# Patient Record
Sex: FEMALE | Race: WHITE | NOT HISPANIC OR LATINO | URBAN - METROPOLITAN AREA
[De-identification: names, ages, dates, MRNs, and addresses within clinical notes are randomized per-mention and may not be internally consistent; named-entity substitution may affect disease eponyms.]

---

## 2017-01-18 ENCOUNTER — OUTPATIENT (OUTPATIENT)
Dept: OUTPATIENT SERVICES | Facility: HOSPITAL | Age: 52
LOS: 1 days | End: 2017-01-18
Payer: COMMERCIAL

## 2017-01-18 VITALS
HEIGHT: 61 IN | TEMPERATURE: 98 F | HEART RATE: 77 BPM | DIASTOLIC BLOOD PRESSURE: 80 MMHG | RESPIRATION RATE: 16 BRPM | SYSTOLIC BLOOD PRESSURE: 126 MMHG | WEIGHT: 126.99 LBS

## 2017-01-18 DIAGNOSIS — Z90.721 ACQUIRED ABSENCE OF OVARIES, UNILATERAL: Chronic | ICD-10-CM

## 2017-01-18 DIAGNOSIS — Z98.89 OTHER SPECIFIED POSTPROCEDURAL STATES: Chronic | ICD-10-CM

## 2017-01-18 DIAGNOSIS — K56.5 INTESTINAL ADHESIONS [BANDS] WITH OBSTRUCTION (POSTINFECTION): ICD-10-CM

## 2017-01-18 DIAGNOSIS — C49.9 MALIGNANT NEOPLASM OF CONNECTIVE AND SOFT TISSUE, UNSPECIFIED: Chronic | ICD-10-CM

## 2017-01-18 LAB
ALBUMIN SERPL ELPH-MCNC: 4.2 G/DL — SIGNIFICANT CHANGE UP (ref 3.3–5)
ALP SERPL-CCNC: 67 U/L — SIGNIFICANT CHANGE UP (ref 40–120)
ALT FLD-CCNC: 7 U/L — SIGNIFICANT CHANGE UP (ref 4–33)
AST SERPL-CCNC: 15 U/L — SIGNIFICANT CHANGE UP (ref 4–32)
BASOPHILS # BLD AUTO: 0.02 K/UL — SIGNIFICANT CHANGE UP (ref 0–0.2)
BASOPHILS NFR BLD AUTO: 0.3 % — SIGNIFICANT CHANGE UP (ref 0–2)
BILIRUB DIRECT SERPL-MCNC: 0.1 MG/DL — SIGNIFICANT CHANGE UP (ref 0.1–0.2)
BILIRUB SERPL-MCNC: 0.2 MG/DL — SIGNIFICANT CHANGE UP (ref 0.2–1.2)
BLD GP AB SCN SERPL QL: NEGATIVE — SIGNIFICANT CHANGE UP
BUN SERPL-MCNC: 14 MG/DL — SIGNIFICANT CHANGE UP (ref 7–23)
CALCIUM SERPL-MCNC: 9.2 MG/DL — SIGNIFICANT CHANGE UP (ref 8.4–10.5)
CHLORIDE SERPL-SCNC: 101 MMOL/L — SIGNIFICANT CHANGE UP (ref 98–107)
CO2 SERPL-SCNC: 29 MMOL/L — SIGNIFICANT CHANGE UP (ref 22–31)
CREAT SERPL-MCNC: 0.64 MG/DL — SIGNIFICANT CHANGE UP (ref 0.5–1.3)
EOSINOPHIL # BLD AUTO: 0.09 K/UL — SIGNIFICANT CHANGE UP (ref 0–0.5)
EOSINOPHIL NFR BLD AUTO: 1.5 % — SIGNIFICANT CHANGE UP (ref 0–6)
GLUCOSE SERPL-MCNC: 94 MG/DL — SIGNIFICANT CHANGE UP (ref 70–99)
HCT VFR BLD CALC: 39 % — SIGNIFICANT CHANGE UP (ref 34.5–45)
HCT VFR BLD CALC: 39 % — SIGNIFICANT CHANGE UP (ref 34.5–45)
HGB BLD-MCNC: 12.6 G/DL — SIGNIFICANT CHANGE UP (ref 11.5–15.5)
HGB BLD-MCNC: 12.6 G/DL — SIGNIFICANT CHANGE UP (ref 11.5–15.5)
IMM GRANULOCYTES NFR BLD AUTO: 0.3 % — SIGNIFICANT CHANGE UP (ref 0–1.5)
LYMPHOCYTES # BLD AUTO: 1.32 K/UL — SIGNIFICANT CHANGE UP (ref 1–3.3)
LYMPHOCYTES # BLD AUTO: 21.4 % — SIGNIFICANT CHANGE UP (ref 13–44)
MCHC RBC-ENTMCNC: 28.4 PG — SIGNIFICANT CHANGE UP (ref 27–34)
MCHC RBC-ENTMCNC: 28.4 PG — SIGNIFICANT CHANGE UP (ref 27–34)
MCHC RBC-ENTMCNC: 32.3 % — SIGNIFICANT CHANGE UP (ref 32–36)
MCHC RBC-ENTMCNC: 32.3 % — SIGNIFICANT CHANGE UP (ref 32–36)
MCV RBC AUTO: 87.8 FL — SIGNIFICANT CHANGE UP (ref 80–100)
MCV RBC AUTO: 87.8 FL — SIGNIFICANT CHANGE UP (ref 80–100)
MONOCYTES # BLD AUTO: 0.29 K/UL — SIGNIFICANT CHANGE UP (ref 0–0.9)
MONOCYTES NFR BLD AUTO: 4.7 % — SIGNIFICANT CHANGE UP (ref 2–14)
NEUTROPHILS # BLD AUTO: 4.42 K/UL — SIGNIFICANT CHANGE UP (ref 1.8–7.4)
NEUTROPHILS NFR BLD AUTO: 71.8 % — SIGNIFICANT CHANGE UP (ref 43–77)
PLATELET # BLD AUTO: 265 K/UL — SIGNIFICANT CHANGE UP (ref 150–400)
PLATELET # BLD AUTO: 265 K/UL — SIGNIFICANT CHANGE UP (ref 150–400)
PMV BLD: 11 FL — SIGNIFICANT CHANGE UP (ref 7–13)
PMV BLD: 11 FL — SIGNIFICANT CHANGE UP (ref 7–13)
POTASSIUM SERPL-MCNC: 4 MMOL/L — SIGNIFICANT CHANGE UP (ref 3.5–5.3)
POTASSIUM SERPL-SCNC: 4 MMOL/L — SIGNIFICANT CHANGE UP (ref 3.5–5.3)
PROT SERPL-MCNC: 6.8 G/DL — SIGNIFICANT CHANGE UP (ref 6–8.3)
RBC # BLD: 4.44 M/UL — SIGNIFICANT CHANGE UP (ref 3.8–5.2)
RBC # BLD: 4.44 M/UL — SIGNIFICANT CHANGE UP (ref 3.8–5.2)
RBC # FLD: 13.6 % — SIGNIFICANT CHANGE UP (ref 10.3–14.5)
RBC # FLD: 13.6 % — SIGNIFICANT CHANGE UP (ref 10.3–14.5)
RH IG SCN BLD-IMP: POSITIVE — SIGNIFICANT CHANGE UP
SODIUM SERPL-SCNC: 144 MMOL/L — SIGNIFICANT CHANGE UP (ref 135–145)
WBC # BLD: 6.16 K/UL — SIGNIFICANT CHANGE UP (ref 3.8–10.5)
WBC # BLD: 6.16 K/UL — SIGNIFICANT CHANGE UP (ref 3.8–10.5)
WBC # FLD AUTO: 6.16 K/UL — SIGNIFICANT CHANGE UP (ref 3.8–10.5)
WBC # FLD AUTO: 6.16 K/UL — SIGNIFICANT CHANGE UP (ref 3.8–10.5)

## 2017-01-18 PROCEDURE — 93010 ELECTROCARDIOGRAM REPORT: CPT

## 2017-01-18 RX ORDER — SODIUM CHLORIDE 9 MG/ML
1000 INJECTION, SOLUTION INTRAVENOUS
Qty: 0 | Refills: 0 | Status: DISCONTINUED | OUTPATIENT
Start: 2017-02-13 | End: 2017-02-14

## 2017-01-18 NOTE — H&P PST ADULT - NSANTHOSAYNRD_GEN_A_CORE
No. FELICIA screening performed.  STOP BANG Legend: 0-2 = LOW Risk; 3-4 = INTERMEDIATE Risk; 5-8 = HIGH Risk

## 2017-01-18 NOTE — H&P PST ADULT - FAMILY HISTORY
<<-----Click on this checkbox to enter Family History Family history of brain cancer     Sibling  Still living? Yes, Estimated age: Age Unknown  Family history of thyroid cancer, Age at diagnosis: Age Unknown Sibling  Still living? No  Family history of brain cancer, Age at diagnosis: Age Unknown  Family history of thyroid cancer, Age at diagnosis: Age Unknown

## 2017-01-18 NOTE — H&P PST ADULT - PMH
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

## 2017-01-18 NOTE — H&P PST ADULT - ATTENDING COMMENTS
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 Plan for laparoscopic adhesiolysis, possible open.  R/B/A explained in office and again in SDA.

## 2017-01-18 NOTE — H&P PST ADULT - ASSESSMENT
52 y/o female with hx of multiple abdominal surgeries including appendectomy 1972, myomectomy 1995, cystectomy 2002, oophorectomy 2004.  Pt reports upper abdominal pain with N/V May 2016. Dx with small bowel obstruction, scar tissue from previous surgeries, s/p [facility] admission.  Pt reports she has had frequent episodes since that time which are becoming more frequent, most recently 1/15/17.   Now scheduled for Diagnostic Laparoscopy for small bowel obstruction recurrent, 1/27/17.

## 2017-01-23 ENCOUNTER — FORM ENCOUNTER (OUTPATIENT)
Age: 52
End: 2017-01-23

## 2017-01-24 ENCOUNTER — OUTPATIENT (OUTPATIENT)
Dept: OUTPATIENT SERVICES | Facility: HOSPITAL | Age: 52
LOS: 1 days | End: 2017-01-24
Payer: COMMERCIAL

## 2017-01-24 ENCOUNTER — APPOINTMENT (OUTPATIENT)
Dept: CT IMAGING | Facility: CLINIC | Age: 52
End: 2017-01-24

## 2017-01-24 ENCOUNTER — APPOINTMENT (OUTPATIENT)
Dept: MRI IMAGING | Facility: CLINIC | Age: 52
End: 2017-01-24

## 2017-01-24 DIAGNOSIS — K56.5 INTESTINAL ADHESIONS [BANDS] WITH OBSTRUCTION (POSTINFECTION): ICD-10-CM

## 2017-01-24 DIAGNOSIS — C49.9 MALIGNANT NEOPLASM OF CONNECTIVE AND SOFT TISSUE, UNSPECIFIED: Chronic | ICD-10-CM

## 2017-01-24 DIAGNOSIS — Z98.89 OTHER SPECIFIED POSTPROCEDURAL STATES: Chronic | ICD-10-CM

## 2017-01-24 DIAGNOSIS — C49.9 MALIGNANT NEOPLASM OF CONNECTIVE AND SOFT TISSUE, UNSPECIFIED: ICD-10-CM

## 2017-01-24 DIAGNOSIS — Z90.721 ACQUIRED ABSENCE OF OVARIES, UNILATERAL: Chronic | ICD-10-CM

## 2017-01-24 PROCEDURE — A9585: CPT

## 2017-01-24 PROCEDURE — 71260 CT THORAX DX C+: CPT

## 2017-01-24 PROCEDURE — 74177 CT ABD & PELVIS W/CONTRAST: CPT

## 2017-01-24 PROCEDURE — 73723 MRI JOINT LWR EXTR W/O&W/DYE: CPT

## 2017-01-26 ENCOUNTER — APPOINTMENT (OUTPATIENT)
Dept: SURGICAL ONCOLOGY | Facility: CLINIC | Age: 52
End: 2017-01-26

## 2017-01-27 ENCOUNTER — APPOINTMENT (OUTPATIENT)
Dept: SURGICAL ONCOLOGY | Facility: HOSPITAL | Age: 52
End: 2017-01-27

## 2017-01-31 ENCOUNTER — RX RENEWAL (OUTPATIENT)
Age: 52
End: 2017-01-31

## 2017-02-06 ENCOUNTER — OUTPATIENT (OUTPATIENT)
Dept: OUTPATIENT SERVICES | Facility: HOSPITAL | Age: 52
LOS: 1 days | End: 2017-02-06

## 2017-02-06 DIAGNOSIS — Z98.89 OTHER SPECIFIED POSTPROCEDURAL STATES: Chronic | ICD-10-CM

## 2017-02-06 DIAGNOSIS — C49.9 MALIGNANT NEOPLASM OF CONNECTIVE AND SOFT TISSUE, UNSPECIFIED: Chronic | ICD-10-CM

## 2017-02-06 DIAGNOSIS — K56.5 INTESTINAL ADHESIONS [BANDS] WITH OBSTRUCTION (POSTINFECTION): ICD-10-CM

## 2017-02-06 DIAGNOSIS — Z90.721 ACQUIRED ABSENCE OF OVARIES, UNILATERAL: Chronic | ICD-10-CM

## 2017-02-06 LAB
BLD GP AB SCN SERPL QL: NEGATIVE — SIGNIFICANT CHANGE UP
RH IG SCN BLD-IMP: POSITIVE — SIGNIFICANT CHANGE UP

## 2017-02-12 ENCOUNTER — RESULT REVIEW (OUTPATIENT)
Age: 52
End: 2017-02-12

## 2017-02-13 ENCOUNTER — INPATIENT (INPATIENT)
Facility: HOSPITAL | Age: 52
LOS: 2 days | Discharge: HOME | End: 2017-02-16
Attending: SURGERY | Admitting: SURGERY
Payer: COMMERCIAL

## 2017-02-13 ENCOUNTER — APPOINTMENT (OUTPATIENT)
Dept: SURGICAL ONCOLOGY | Facility: HOSPITAL | Age: 52
End: 2017-02-13

## 2017-02-13 VITALS
HEIGHT: 61 IN | TEMPERATURE: 98 F | RESPIRATION RATE: 16 BRPM | SYSTOLIC BLOOD PRESSURE: 111 MMHG | DIASTOLIC BLOOD PRESSURE: 67 MMHG | OXYGEN SATURATION: 100 % | HEART RATE: 89 BPM | WEIGHT: 126.99 LBS

## 2017-02-13 DIAGNOSIS — Z90.721 ACQUIRED ABSENCE OF OVARIES, UNILATERAL: Chronic | ICD-10-CM

## 2017-02-13 DIAGNOSIS — Z98.89 OTHER SPECIFIED POSTPROCEDURAL STATES: Chronic | ICD-10-CM

## 2017-02-13 DIAGNOSIS — K56.5 INTESTINAL ADHESIONS [BANDS] WITH OBSTRUCTION (POSTINFECTION): ICD-10-CM

## 2017-02-13 DIAGNOSIS — C49.9 MALIGNANT NEOPLASM OF CONNECTIVE AND SOFT TISSUE, UNSPECIFIED: Chronic | ICD-10-CM

## 2017-02-13 LAB — HCG UR QL: NEGATIVE — SIGNIFICANT CHANGE UP

## 2017-02-13 PROCEDURE — 88307 TISSUE EXAM BY PATHOLOGIST: CPT | Mod: 26

## 2017-02-13 RX ORDER — HYDROMORPHONE HYDROCHLORIDE 2 MG/ML
30 INJECTION INTRAMUSCULAR; INTRAVENOUS; SUBCUTANEOUS
Qty: 0 | Refills: 0 | Status: DISCONTINUED | OUTPATIENT
Start: 2017-02-13 | End: 2017-02-15

## 2017-02-13 RX ORDER — HYDROMORPHONE HYDROCHLORIDE 2 MG/ML
0.5 INJECTION INTRAMUSCULAR; INTRAVENOUS; SUBCUTANEOUS
Qty: 0 | Refills: 0 | Status: DISCONTINUED | OUTPATIENT
Start: 2017-02-13 | End: 2017-02-13

## 2017-02-13 RX ORDER — QUETIAPINE FUMARATE 200 MG/1
25 TABLET, FILM COATED ORAL AT BEDTIME
Qty: 0 | Refills: 0 | Status: DISCONTINUED | OUTPATIENT
Start: 2017-02-13 | End: 2017-02-16

## 2017-02-13 RX ORDER — KETOROLAC TROMETHAMINE 30 MG/ML
15 SYRINGE (ML) INJECTION EVERY 6 HOURS
Qty: 0 | Refills: 0 | Status: DISCONTINUED | OUTPATIENT
Start: 2017-02-13 | End: 2017-02-13

## 2017-02-13 RX ORDER — HYDROMORPHONE HYDROCHLORIDE 2 MG/ML
1 INJECTION INTRAMUSCULAR; INTRAVENOUS; SUBCUTANEOUS
Qty: 0 | Refills: 0 | Status: DISCONTINUED | OUTPATIENT
Start: 2017-02-13 | End: 2017-02-13

## 2017-02-13 RX ORDER — ONDANSETRON 8 MG/1
4 TABLET, FILM COATED ORAL EVERY 6 HOURS
Qty: 0 | Refills: 0 | Status: DISCONTINUED | OUTPATIENT
Start: 2017-02-13 | End: 2017-02-16

## 2017-02-13 RX ORDER — HEPARIN SODIUM 5000 [USP'U]/ML
5000 INJECTION INTRAVENOUS; SUBCUTANEOUS EVERY 8 HOURS
Qty: 0 | Refills: 0 | Status: DISCONTINUED | OUTPATIENT
Start: 2017-02-13 | End: 2017-02-16

## 2017-02-13 RX ORDER — ONDANSETRON 8 MG/1
4 TABLET, FILM COATED ORAL EVERY 6 HOURS
Qty: 0 | Refills: 0 | Status: DISCONTINUED | OUTPATIENT
Start: 2017-02-13 | End: 2017-02-15

## 2017-02-13 RX ORDER — HYDROMORPHONE HYDROCHLORIDE 2 MG/ML
0.5 INJECTION INTRAMUSCULAR; INTRAVENOUS; SUBCUTANEOUS
Qty: 0 | Refills: 0 | Status: DISCONTINUED | OUTPATIENT
Start: 2017-02-13 | End: 2017-02-15

## 2017-02-13 RX ORDER — ATORVASTATIN CALCIUM 80 MG/1
10 TABLET, FILM COATED ORAL AT BEDTIME
Qty: 0 | Refills: 0 | Status: DISCONTINUED | OUTPATIENT
Start: 2017-02-13 | End: 2017-02-16

## 2017-02-13 RX ORDER — ONDANSETRON 8 MG/1
4 TABLET, FILM COATED ORAL ONCE
Qty: 0 | Refills: 0 | Status: DISCONTINUED | OUTPATIENT
Start: 2017-02-13 | End: 2017-02-16

## 2017-02-13 RX ORDER — PANTOPRAZOLE SODIUM 20 MG/1
40 TABLET, DELAYED RELEASE ORAL DAILY
Qty: 0 | Refills: 0 | Status: DISCONTINUED | OUTPATIENT
Start: 2017-02-13 | End: 2017-02-16

## 2017-02-13 RX ORDER — ACETAMINOPHEN 500 MG
1000 TABLET ORAL ONCE
Qty: 0 | Refills: 0 | Status: COMPLETED | OUTPATIENT
Start: 2017-02-14 | End: 2017-02-14

## 2017-02-13 RX ORDER — SODIUM CHLORIDE 9 MG/ML
1000 INJECTION, SOLUTION INTRAVENOUS ONCE
Qty: 0 | Refills: 0 | Status: COMPLETED | OUTPATIENT
Start: 2017-02-13 | End: 2017-02-13

## 2017-02-13 RX ORDER — NALOXONE HYDROCHLORIDE 4 MG/.1ML
0.1 SPRAY NASAL
Qty: 0 | Refills: 0 | Status: DISCONTINUED | OUTPATIENT
Start: 2017-02-13 | End: 2017-02-15

## 2017-02-13 RX ORDER — KETOROLAC TROMETHAMINE 30 MG/ML
15 SYRINGE (ML) INJECTION EVERY 6 HOURS
Qty: 0 | Refills: 0 | Status: DISCONTINUED | OUTPATIENT
Start: 2017-02-13 | End: 2017-02-15

## 2017-02-13 RX ORDER — VENLAFAXINE HCL 75 MG
75 CAPSULE, EXT RELEASE 24 HR ORAL
Qty: 0 | Refills: 0 | Status: DISCONTINUED | OUTPATIENT
Start: 2017-02-13 | End: 2017-02-16

## 2017-02-13 RX ORDER — HYDROMORPHONE HYDROCHLORIDE 2 MG/ML
1 INJECTION INTRAMUSCULAR; INTRAVENOUS; SUBCUTANEOUS
Qty: 0 | Refills: 0 | Status: DISCONTINUED | OUTPATIENT
Start: 2017-02-13 | End: 2017-02-15

## 2017-02-13 RX ORDER — ACETAMINOPHEN 500 MG
1000 TABLET ORAL ONCE
Qty: 0 | Refills: 0 | Status: COMPLETED | OUTPATIENT
Start: 2017-02-13 | End: 2017-02-13

## 2017-02-13 RX ADMIN — HYDROMORPHONE HYDROCHLORIDE 0.5 MILLIGRAM(S): 2 INJECTION INTRAMUSCULAR; INTRAVENOUS; SUBCUTANEOUS at 18:33

## 2017-02-13 RX ADMIN — HYDROMORPHONE HYDROCHLORIDE 30 MILLILITER(S): 2 INJECTION INTRAMUSCULAR; INTRAVENOUS; SUBCUTANEOUS at 22:58

## 2017-02-13 RX ADMIN — SODIUM CHLORIDE 2000 MILLILITER(S): 9 INJECTION, SOLUTION INTRAVENOUS at 20:00

## 2017-02-13 RX ADMIN — HYDROMORPHONE HYDROCHLORIDE 0.5 MILLIGRAM(S): 2 INJECTION INTRAMUSCULAR; INTRAVENOUS; SUBCUTANEOUS at 17:58

## 2017-02-13 RX ADMIN — QUETIAPINE FUMARATE 25 MILLIGRAM(S): 200 TABLET, FILM COATED ORAL at 23:33

## 2017-02-13 RX ADMIN — Medication 15 MILLIGRAM(S): at 21:54

## 2017-02-13 RX ADMIN — ATORVASTATIN CALCIUM 10 MILLIGRAM(S): 80 TABLET, FILM COATED ORAL at 23:33

## 2017-02-13 RX ADMIN — Medication 400 MILLIGRAM(S): at 21:55

## 2017-02-13 RX ADMIN — HEPARIN SODIUM 5000 UNIT(S): 5000 INJECTION INTRAVENOUS; SUBCUTANEOUS at 21:55

## 2017-02-13 RX ADMIN — SODIUM CHLORIDE 75 MILLILITER(S): 9 INJECTION, SOLUTION INTRAVENOUS at 22:57

## 2017-02-13 RX ADMIN — HYDROMORPHONE HYDROCHLORIDE 30 MILLILITER(S): 2 INJECTION INTRAMUSCULAR; INTRAVENOUS; SUBCUTANEOUS at 18:11

## 2017-02-13 NOTE — ASU PATIENT PROFILE, ADULT - PSH
S/P appendectomy  1972  S/P  section    S/P lumpectomy, right breast  benign   S/P myomectomy    S/P oophorectomy  2005  S/P ovarian cystectomy    S/P tonsillectomy  1992  Sarcoma  s/p removal 2011  surgery 10/17/2014  Radiation afterwards

## 2017-02-13 NOTE — BRIEF OPERATIVE NOTE - PROCEDURE
Omental flap intra-abdominal  02/13/2017  overlying anastamosis  Active  [id number]  Small bowel resection, exteriorized segment  02/13/2017    Active  [id number]  Enterolysis, laparoscopic  02/13/2017    Active  [id number]  Diagnostic laparoscopy  02/13/2017    Active  [id number]

## 2017-02-13 NOTE — BRIEF OPERATIVE NOTE - OPERATION/FINDINGS
Extensive adhesions, fibrotic changes in RLQ secondary to radiation. Small bowel run to reveal section causing chronic obstructions, 2 loops of small bowel just proximal to TI. Adhesions lysed and bowel exteriorized, this section resected and a side to side anastomosis performed.

## 2017-02-13 NOTE — ASU PATIENT PROFILE, ADULT - PMH
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

## 2017-02-14 LAB
BUN SERPL-MCNC: 13 MG/DL — SIGNIFICANT CHANGE UP (ref 7–23)
CALCIUM SERPL-MCNC: 8.4 MG/DL — SIGNIFICANT CHANGE UP (ref 8.4–10.5)
CHLORIDE SERPL-SCNC: 102 MMOL/L — SIGNIFICANT CHANGE UP (ref 98–107)
CO2 SERPL-SCNC: 23 MMOL/L — SIGNIFICANT CHANGE UP (ref 22–31)
CREAT SERPL-MCNC: 0.51 MG/DL — SIGNIFICANT CHANGE UP (ref 0.5–1.3)
GLUCOSE SERPL-MCNC: 73 MG/DL — SIGNIFICANT CHANGE UP (ref 70–99)
HCT VFR BLD CALC: 31.1 % — LOW (ref 34.5–45)
HGB BLD-MCNC: 10.2 G/DL — LOW (ref 11.5–15.5)
MAGNESIUM SERPL-MCNC: 1.7 MG/DL — SIGNIFICANT CHANGE UP (ref 1.6–2.6)
MCHC RBC-ENTMCNC: 28.2 PG — SIGNIFICANT CHANGE UP (ref 27–34)
MCHC RBC-ENTMCNC: 32.8 % — SIGNIFICANT CHANGE UP (ref 32–36)
MCV RBC AUTO: 85.9 FL — SIGNIFICANT CHANGE UP (ref 80–100)
PHOSPHATE SERPL-MCNC: 4.5 MG/DL — SIGNIFICANT CHANGE UP (ref 2.5–4.5)
PLATELET # BLD AUTO: 193 K/UL — SIGNIFICANT CHANGE UP (ref 150–400)
PMV BLD: 10.5 FL — SIGNIFICANT CHANGE UP (ref 7–13)
POTASSIUM SERPL-MCNC: 3.9 MMOL/L — SIGNIFICANT CHANGE UP (ref 3.5–5.3)
POTASSIUM SERPL-SCNC: 3.9 MMOL/L — SIGNIFICANT CHANGE UP (ref 3.5–5.3)
RBC # BLD: 3.62 M/UL — LOW (ref 3.8–5.2)
RBC # FLD: 13.3 % — SIGNIFICANT CHANGE UP (ref 10.3–14.5)
SODIUM SERPL-SCNC: 140 MMOL/L — SIGNIFICANT CHANGE UP (ref 135–145)
WBC # BLD: 9.46 K/UL — SIGNIFICANT CHANGE UP (ref 3.8–10.5)
WBC # FLD AUTO: 9.46 K/UL — SIGNIFICANT CHANGE UP (ref 3.8–10.5)

## 2017-02-14 RX ORDER — SODIUM CHLORIDE 9 MG/ML
1000 INJECTION, SOLUTION INTRAVENOUS
Qty: 0 | Refills: 0 | Status: DISCONTINUED | OUTPATIENT
Start: 2017-02-14 | End: 2017-02-15

## 2017-02-14 RX ORDER — SODIUM CHLORIDE 9 MG/ML
1000 INJECTION, SOLUTION INTRAVENOUS
Qty: 0 | Refills: 0 | Status: DISCONTINUED | OUTPATIENT
Start: 2017-02-14 | End: 2017-02-14

## 2017-02-14 RX ORDER — MAGNESIUM SULFATE 500 MG/ML
2 VIAL (ML) INJECTION ONCE
Qty: 0 | Refills: 0 | Status: COMPLETED | OUTPATIENT
Start: 2017-02-14 | End: 2017-02-14

## 2017-02-14 RX ADMIN — Medication 75 MILLIGRAM(S): at 07:32

## 2017-02-14 RX ADMIN — Medication 15 MILLIGRAM(S): at 16:29

## 2017-02-14 RX ADMIN — SODIUM CHLORIDE 125 MILLILITER(S): 9 INJECTION, SOLUTION INTRAVENOUS at 15:37

## 2017-02-14 RX ADMIN — Medication 1000 MILLIGRAM(S): at 05:40

## 2017-02-14 RX ADMIN — Medication 15 MILLIGRAM(S): at 05:40

## 2017-02-14 RX ADMIN — Medication 1000 MILLIGRAM(S): at 10:13

## 2017-02-14 RX ADMIN — Medication 50 GRAM(S): at 09:30

## 2017-02-14 RX ADMIN — HYDROMORPHONE HYDROCHLORIDE 30 MILLILITER(S): 2 INJECTION INTRAMUSCULAR; INTRAVENOUS; SUBCUTANEOUS at 20:03

## 2017-02-14 RX ADMIN — Medication 15 MILLIGRAM(S): at 16:16

## 2017-02-14 RX ADMIN — Medication 15 MILLIGRAM(S): at 10:04

## 2017-02-14 RX ADMIN — Medication 1000 MILLIGRAM(S): at 16:29

## 2017-02-14 RX ADMIN — QUETIAPINE FUMARATE 25 MILLIGRAM(S): 200 TABLET, FILM COATED ORAL at 21:37

## 2017-02-14 RX ADMIN — ATORVASTATIN CALCIUM 10 MILLIGRAM(S): 80 TABLET, FILM COATED ORAL at 21:37

## 2017-02-14 RX ADMIN — SODIUM CHLORIDE 93 MILLILITER(S): 9 INJECTION, SOLUTION INTRAVENOUS at 07:32

## 2017-02-14 RX ADMIN — Medication 15 MILLIGRAM(S): at 21:37

## 2017-02-14 RX ADMIN — PANTOPRAZOLE SODIUM 40 MILLIGRAM(S): 20 TABLET, DELAYED RELEASE ORAL at 05:09

## 2017-02-14 RX ADMIN — Medication 15 MILLIGRAM(S): at 05:10

## 2017-02-14 RX ADMIN — Medication 15 MILLIGRAM(S): at 22:07

## 2017-02-14 RX ADMIN — HEPARIN SODIUM 5000 UNIT(S): 5000 INJECTION INTRAVENOUS; SUBCUTANEOUS at 21:37

## 2017-02-14 RX ADMIN — HEPARIN SODIUM 5000 UNIT(S): 5000 INJECTION INTRAVENOUS; SUBCUTANEOUS at 13:35

## 2017-02-14 RX ADMIN — HEPARIN SODIUM 5000 UNIT(S): 5000 INJECTION INTRAVENOUS; SUBCUTANEOUS at 05:10

## 2017-02-14 RX ADMIN — Medication 15 MILLIGRAM(S): at 10:13

## 2017-02-14 RX ADMIN — ONDANSETRON 4 MILLIGRAM(S): 8 TABLET, FILM COATED ORAL at 15:47

## 2017-02-14 RX ADMIN — Medication 400 MILLIGRAM(S): at 10:04

## 2017-02-14 RX ADMIN — Medication 400 MILLIGRAM(S): at 05:10

## 2017-02-14 RX ADMIN — HYDROMORPHONE HYDROCHLORIDE 30 MILLILITER(S): 2 INJECTION INTRAMUSCULAR; INTRAVENOUS; SUBCUTANEOUS at 07:56

## 2017-02-14 RX ADMIN — Medication 400 MILLIGRAM(S): at 16:15

## 2017-02-14 NOTE — PROVIDER CONTACT NOTE (OTHER) - ACTION/TREATMENT ORDERED:
LR bolus as ordered
Pa aware. continue to monitor.
Md aware. Pain services consulted, no need for pca pump adjustment. since uring output is adequate no need for bolus. MD increased fluids from 75-93

## 2017-02-15 LAB
BASOPHILS # BLD AUTO: 0.01 K/UL — SIGNIFICANT CHANGE UP (ref 0–0.2)
BASOPHILS NFR BLD AUTO: 0.2 % — SIGNIFICANT CHANGE UP (ref 0–2)
BUN SERPL-MCNC: 7 MG/DL — SIGNIFICANT CHANGE UP (ref 7–23)
CALCIUM SERPL-MCNC: 8.5 MG/DL — SIGNIFICANT CHANGE UP (ref 8.4–10.5)
CHLORIDE SERPL-SCNC: 102 MMOL/L — SIGNIFICANT CHANGE UP (ref 98–107)
CO2 SERPL-SCNC: 24 MMOL/L — SIGNIFICANT CHANGE UP (ref 22–31)
CREAT SERPL-MCNC: 0.52 MG/DL — SIGNIFICANT CHANGE UP (ref 0.5–1.3)
EOSINOPHIL # BLD AUTO: 0.15 K/UL — SIGNIFICANT CHANGE UP (ref 0–0.5)
EOSINOPHIL NFR BLD AUTO: 2.5 % — SIGNIFICANT CHANGE UP (ref 0–6)
GLUCOSE SERPL-MCNC: 68 MG/DL — LOW (ref 70–99)
HCT VFR BLD CALC: 32.8 % — LOW (ref 34.5–45)
HGB BLD-MCNC: 10.9 G/DL — LOW (ref 11.5–15.5)
IMM GRANULOCYTES NFR BLD AUTO: 0.2 % — SIGNIFICANT CHANGE UP (ref 0–1.5)
LYMPHOCYTES # BLD AUTO: 0.56 K/UL — LOW (ref 1–3.3)
LYMPHOCYTES # BLD AUTO: 9.4 % — LOW (ref 13–44)
MAGNESIUM SERPL-MCNC: 1.8 MG/DL — SIGNIFICANT CHANGE UP (ref 1.6–2.6)
MCHC RBC-ENTMCNC: 28.9 PG — SIGNIFICANT CHANGE UP (ref 27–34)
MCHC RBC-ENTMCNC: 33.2 % — SIGNIFICANT CHANGE UP (ref 32–36)
MCV RBC AUTO: 87 FL — SIGNIFICANT CHANGE UP (ref 80–100)
MONOCYTES # BLD AUTO: 0.24 K/UL — SIGNIFICANT CHANGE UP (ref 0–0.9)
MONOCYTES NFR BLD AUTO: 4 % — SIGNIFICANT CHANGE UP (ref 2–14)
NEUTROPHILS # BLD AUTO: 4.98 K/UL — SIGNIFICANT CHANGE UP (ref 1.8–7.4)
NEUTROPHILS NFR BLD AUTO: 83.7 % — HIGH (ref 43–77)
PHOSPHATE SERPL-MCNC: 2.3 MG/DL — LOW (ref 2.5–4.5)
PLATELET # BLD AUTO: 195 K/UL — SIGNIFICANT CHANGE UP (ref 150–400)
PMV BLD: 10.7 FL — SIGNIFICANT CHANGE UP (ref 7–13)
POTASSIUM SERPL-MCNC: 3.8 MMOL/L — SIGNIFICANT CHANGE UP (ref 3.5–5.3)
POTASSIUM SERPL-SCNC: 3.8 MMOL/L — SIGNIFICANT CHANGE UP (ref 3.5–5.3)
RBC # BLD: 3.77 M/UL — LOW (ref 3.8–5.2)
RBC # FLD: 13.3 % — SIGNIFICANT CHANGE UP (ref 10.3–14.5)
SODIUM SERPL-SCNC: 141 MMOL/L — SIGNIFICANT CHANGE UP (ref 135–145)
WBC # BLD: 5.95 K/UL — SIGNIFICANT CHANGE UP (ref 3.8–10.5)
WBC # FLD AUTO: 5.95 K/UL — SIGNIFICANT CHANGE UP (ref 3.8–10.5)

## 2017-02-15 RX ORDER — ACETAMINOPHEN 500 MG
650 TABLET ORAL EVERY 6 HOURS
Qty: 0 | Refills: 0 | Status: DISCONTINUED | OUTPATIENT
Start: 2017-02-15 | End: 2017-02-16

## 2017-02-15 RX ORDER — OXYCODONE HYDROCHLORIDE 5 MG/1
5 TABLET ORAL
Qty: 0 | Refills: 0 | Status: DISCONTINUED | OUTPATIENT
Start: 2017-02-15 | End: 2017-02-16

## 2017-02-15 RX ORDER — OXYCODONE HYDROCHLORIDE 5 MG/1
10 TABLET ORAL
Qty: 0 | Refills: 0 | Status: DISCONTINUED | OUTPATIENT
Start: 2017-02-15 | End: 2017-02-16

## 2017-02-15 RX ORDER — POTASSIUM PHOSPHATE, MONOBASIC POTASSIUM PHOSPHATE, DIBASIC 236; 224 MG/ML; MG/ML
15 INJECTION, SOLUTION INTRAVENOUS ONCE
Qty: 0 | Refills: 0 | Status: COMPLETED | OUTPATIENT
Start: 2017-02-15 | End: 2017-02-15

## 2017-02-15 RX ORDER — MAGNESIUM SULFATE 500 MG/ML
2 VIAL (ML) INJECTION ONCE
Qty: 0 | Refills: 0 | Status: COMPLETED | OUTPATIENT
Start: 2017-02-15 | End: 2017-02-15

## 2017-02-15 RX ADMIN — ATORVASTATIN CALCIUM 10 MILLIGRAM(S): 80 TABLET, FILM COATED ORAL at 21:29

## 2017-02-15 RX ADMIN — Medication 650 MILLIGRAM(S): at 17:47

## 2017-02-15 RX ADMIN — Medication 15 MILLIGRAM(S): at 12:07

## 2017-02-15 RX ADMIN — Medication 650 MILLIGRAM(S): at 12:06

## 2017-02-15 RX ADMIN — OXYCODONE HYDROCHLORIDE 5 MILLIGRAM(S): 5 TABLET ORAL at 16:46

## 2017-02-15 RX ADMIN — HEPARIN SODIUM 5000 UNIT(S): 5000 INJECTION INTRAVENOUS; SUBCUTANEOUS at 14:42

## 2017-02-15 RX ADMIN — OXYCODONE HYDROCHLORIDE 5 MILLIGRAM(S): 5 TABLET ORAL at 12:29

## 2017-02-15 RX ADMIN — OXYCODONE HYDROCHLORIDE 5 MILLIGRAM(S): 5 TABLET ORAL at 22:04

## 2017-02-15 RX ADMIN — Medication 75 MILLIGRAM(S): at 07:45

## 2017-02-15 RX ADMIN — PANTOPRAZOLE SODIUM 40 MILLIGRAM(S): 20 TABLET, DELAYED RELEASE ORAL at 05:57

## 2017-02-15 RX ADMIN — Medication 15 MILLIGRAM(S): at 06:15

## 2017-02-15 RX ADMIN — Medication 15 MILLIGRAM(S): at 05:58

## 2017-02-15 RX ADMIN — HYDROMORPHONE HYDROCHLORIDE 30 MILLILITER(S): 2 INJECTION INTRAMUSCULAR; INTRAVENOUS; SUBCUTANEOUS at 08:21

## 2017-02-15 RX ADMIN — HEPARIN SODIUM 5000 UNIT(S): 5000 INJECTION INTRAVENOUS; SUBCUTANEOUS at 21:29

## 2017-02-15 RX ADMIN — OXYCODONE HYDROCHLORIDE 5 MILLIGRAM(S): 5 TABLET ORAL at 21:34

## 2017-02-15 RX ADMIN — Medication 50 GRAM(S): at 07:44

## 2017-02-15 RX ADMIN — POTASSIUM PHOSPHATE, MONOBASIC POTASSIUM PHOSPHATE, DIBASIC 62.5 MILLIMOLE(S): 236; 224 INJECTION, SOLUTION INTRAVENOUS at 12:06

## 2017-02-15 RX ADMIN — QUETIAPINE FUMARATE 25 MILLIGRAM(S): 200 TABLET, FILM COATED ORAL at 21:28

## 2017-02-15 RX ADMIN — Medication 15 MILLIGRAM(S): at 17:47

## 2017-02-15 RX ADMIN — Medication 650 MILLIGRAM(S): at 13:15

## 2017-02-15 RX ADMIN — HEPARIN SODIUM 5000 UNIT(S): 5000 INJECTION INTRAVENOUS; SUBCUTANEOUS at 05:57

## 2017-02-15 RX ADMIN — SODIUM CHLORIDE 125 MILLILITER(S): 9 INJECTION, SOLUTION INTRAVENOUS at 07:45

## 2017-02-16 ENCOUNTER — TRANSCRIPTION ENCOUNTER (OUTPATIENT)
Age: 52
End: 2017-02-16

## 2017-02-16 VITALS
RESPIRATION RATE: 17 BRPM | DIASTOLIC BLOOD PRESSURE: 76 MMHG | HEART RATE: 80 BPM | OXYGEN SATURATION: 99 % | SYSTOLIC BLOOD PRESSURE: 117 MMHG | TEMPERATURE: 98 F

## 2017-02-16 PROCEDURE — 99238 HOSP IP/OBS DSCHRG MGMT 30/<: CPT | Mod: 24

## 2017-02-16 RX ORDER — ACETAMINOPHEN 500 MG
2 TABLET ORAL
Qty: 0 | Refills: 0 | DISCHARGE
Start: 2017-02-16

## 2017-02-16 RX ORDER — OXYCODONE HYDROCHLORIDE 5 MG/1
1 TABLET ORAL
Qty: 20 | Refills: 0
Start: 2017-02-16

## 2017-02-16 RX ORDER — ACETAMINOPHEN 500 MG
2 TABLET ORAL
Qty: 0 | Refills: 0 | COMMUNITY
Start: 2017-02-16

## 2017-02-16 RX ORDER — OXYCODONE HYDROCHLORIDE 5 MG/1
1 TABLET ORAL
Qty: 0 | Refills: 0 | DISCHARGE
Start: 2017-02-16

## 2017-02-16 RX ADMIN — Medication 75 MILLIGRAM(S): at 08:23

## 2017-02-16 RX ADMIN — OXYCODONE HYDROCHLORIDE 5 MILLIGRAM(S): 5 TABLET ORAL at 06:51

## 2017-02-16 RX ADMIN — OXYCODONE HYDROCHLORIDE 5 MILLIGRAM(S): 5 TABLET ORAL at 06:21

## 2017-02-16 RX ADMIN — Medication 650 MILLIGRAM(S): at 00:19

## 2017-02-16 RX ADMIN — Medication 650 MILLIGRAM(S): at 00:49

## 2017-02-16 RX ADMIN — PANTOPRAZOLE SODIUM 40 MILLIGRAM(S): 20 TABLET, DELAYED RELEASE ORAL at 06:16

## 2017-02-16 RX ADMIN — OXYCODONE HYDROCHLORIDE 5 MILLIGRAM(S): 5 TABLET ORAL at 10:37

## 2017-02-16 RX ADMIN — Medication 650 MILLIGRAM(S): at 11:31

## 2017-02-16 RX ADMIN — OXYCODONE HYDROCHLORIDE 5 MILLIGRAM(S): 5 TABLET ORAL at 10:01

## 2017-02-16 RX ADMIN — OXYCODONE HYDROCHLORIDE 5 MILLIGRAM(S): 5 TABLET ORAL at 13:02

## 2017-02-16 NOTE — DISCHARGE NOTE ADULT - MEDICATION SUMMARY - MEDICATIONS TO TAKE
I will START or STAY ON the medications listed below when I get home from the hospital:    oxyCODONE 5 mg oral tablet  -- 1-2 tabs every 4 hours as needed for pain- up to 6 tablets /day MDD:6  -- Indication: For pain    acetaminophen 325 mg oral tablet  -- 2 tab(s) by mouth every 6 hours, As Needed  -- Indication: For pain    Effexor 75 mg oral capsule, extended release  -- 1 cap(s) by mouth once a day in morning  -- Indication: For home med    Lipitor 10 mg oral tablet  -- 1 tab(s) by mouth once a day (at bedtime)  -- Indication: For home med    SEROquel 25 mg oral tablet  -- 1 tab(s) by mouth once a day (at bedtime)  -- Indication: For home med    Senokot 8.6 mg oral tablet  -- 1 tab(s) by mouth once a day (at bedtime)  -- Indication: For home med    Centrum Antioxidant Multiple Vitamins and Minerals oral tablet  -- 1 tab(s) by mouth once a day in morning- last doswe 1/20/2017  -- Indication: For home med

## 2017-02-16 NOTE — DISCHARGE NOTE ADULT - MEDICATION SUMMARY - MEDICATIONS TO STOP TAKING
I will STOP taking the medications listed below when I get home from the hospital:    meloxicam 15 mg oral tablet  -- 1 tab(s) by mouth once a day in morning -lasat dose 1/20/2017

## 2017-02-16 NOTE — DISCHARGE NOTE ADULT - CARE PLAN
Principal Discharge DX:	Small bowel obstruction due to adhesions  Goal:	status diagnostic laparoscopy, lysis of adhesions, small bowel resection, omental flap over lying anastamosis  Instructions for follow-up, activity and diet:	Please call  to schedule a follow up appointment in 1-2 weeks with Dr Lynch  -Please allow steri-strips to fall off on their own.  Ok to shower and rinse wound with warm soapy water.  Do not scrub wound, pat dry. Please call the doctor immediately if you develop fever, chills, inability to tolerate liquid or food, diarrhea, nausea, vomiting or increased abdominal pain. Follow a regular diet. Do not drive or operate machinery while taking narcotic pain medication.  Please call to make an appointment to follow up with your primary care physician regarding your recent hospitalization.  Secondary Diagnosis:	Hyperlipidemia  Goal:	Continue current medication Principal Discharge DX:	Small bowel obstruction due to adhesions  Goal:	Status post diagnostic laparoscopy, lysis of adhesions, small bowel resection, omental flap over lying anastamosis  Instructions for follow-up, activity and diet:	Please call  to schedule a follow up appointment in 1-2 weeks with Dr Lynch  Please allow steri-strips to fall off on their own.  Ok to shower and rinse wound with warm soapy water.  Do not scrub wound, pat dry. Please call the doctor immediately if you develop fever, chills, inability to tolerate liquid or food, diarrhea, nausea, vomiting or increased abdominal pain. Follow a regular diet. Do not drive or operate machinery while taking narcotic pain medication.  Please call to make an appointment to follow up with your primary care physician regarding your recent hospitalization.  Secondary Diagnosis:	Hyperlipidemia  Goal:	Continue current medication

## 2017-02-16 NOTE — DISCHARGE NOTE ADULT - HOSPITAL COURSE
52 y/o female with hx of multiple abdominal surgeries including appendectomy 1972, myomectomy 1995, cystectomy 2002, oophorectomy 2004.  Pt reports upper abdominal pain with N/V May 2016. Dx with small bowel obstruction, scar tissue from previous surgeries, s/p [facility] admission.  Pt reports she has had frequent episodes since that time which are becoming more frequent, most recently 1/15/17.   Now scheduled for Diagnostic Laparoscopy for small bowel obstruction recurrent, 1/27/17.     Pt is s/p diagnostic laparoscopy, lysis of adhesions, small bowel resection, omental flap over lying anastamosis.  Pt did well post operatively.  She was slowly advanced from clears to regular diet and tolerated well.  Pain is well controlled.  She is voiding and ambulating without difficulty.    Pt is stable for discharge home and will follow up with Dr Lynch in 1-2 weeks. 50 y/o female with hx of multiple abdominal surgeries including appendectomy 1972, myomectomy 1995, cystectomy 2002, oophorectomy 2004. Pt reports upper abdominal pain with N/V May 2016. Dx with small bowel obstruction, scar tissue from previous surgeries, s/p [facility] admission. Pt reports she has had frequent episodes since that time which are becoming more frequent, most recently 1/15/17. Now scheduled for Diagnostic Laparoscopy for small bowel obstruction recurrent, 1/27/17.     Pt is s/p diagnostic laparoscopy, lysis of adhesions, small bowel resection, omental flap over lying anastamosis. Pt did well post operatively. She was slowly advanced from clears to regular diet and tolerated well. Pain is well controlled. She is voiding and ambulating without difficulty.    Pt is stable for discharge home and will follow up with Dr. Lynch in 1-2 weeks.

## 2017-02-16 NOTE — DISCHARGE NOTE ADULT - PLAN OF CARE
status diagnostic laparoscopy, lysis of adhesions, small bowel resection, omental flap over lying anastamosis Please call  to schedule a follow up appointment in 1-2 weeks with Dr Lynch  -Please allow steri-strips to fall off on their own.  Ok to shower and rinse wound with warm soapy water.  Do not scrub wound, pat dry. Please call the doctor immediately if you develop fever, chills, inability to tolerate liquid or food, diarrhea, nausea, vomiting or increased abdominal pain. Follow a regular diet. Do not drive or operate machinery while taking narcotic pain medication.  Please call to make an appointment to follow up with your primary care physician regarding your recent hospitalization. Continue current medication Status post diagnostic laparoscopy, lysis of adhesions, small bowel resection, omental flap over lying anastamosis Please call  to schedule a follow up appointment in 1-2 weeks with Dr Lynch  Please allow steri-strips to fall off on their own.  Ok to shower and rinse wound with warm soapy water.  Do not scrub wound, pat dry. Please call the doctor immediately if you develop fever, chills, inability to tolerate liquid or food, diarrhea, nausea, vomiting or increased abdominal pain. Follow a regular diet. Do not drive or operate machinery while taking narcotic pain medication.  Please call to make an appointment to follow up with your primary care physician regarding your recent hospitalization.

## 2017-02-16 NOTE — DISCHARGE NOTE ADULT - PATIENT PORTAL LINK FT
“You can access the Follow[facility] Patient Portal, offered by [facility], by registering with the following website: [email or web address]”

## 2017-02-16 NOTE — DISCHARGE NOTE ADULT - NS AS ACTIVITY OBS
Do not drive while taking pain medications./Showering allowed/Do not make important decisions/Do not drive or operate machinery/Driving allowed/Walking-Indoors allowed/Walking-Outdoors allowed/No Heavy lifting/straining

## 2017-02-16 NOTE — DISCHARGE NOTE ADULT - CARE PROVIDERS DIRECT ADDRESSES
[email or web address]

## 2017-02-16 NOTE — DISCHARGE NOTE ADULT - CARE PROVIDER_API CALL
Nikolas Lynch (MD), Surgery  [address] [place] [id number]  Phone: [phone]  Fax: [phone]

## 2017-02-21 LAB — SURGICAL PATHOLOGY STUDY: SIGNIFICANT CHANGE UP

## 2017-02-22 ENCOUNTER — TRANSCRIPTION ENCOUNTER (OUTPATIENT)
Age: 52
End: 2017-02-22

## 2017-02-28 ENCOUNTER — APPOINTMENT (OUTPATIENT)
Dept: SURGICAL ONCOLOGY | Facility: CLINIC | Age: 52
End: 2017-02-28

## 2017-02-28 VITALS
OXYGEN SATURATION: 99 % | HEIGHT: 61 IN | HEART RATE: 69 BPM | DIASTOLIC BLOOD PRESSURE: 75 MMHG | SYSTOLIC BLOOD PRESSURE: 114 MMHG | TEMPERATURE: 98 F | WEIGHT: 123 LBS | BODY MASS INDEX: 23.22 KG/M2

## 2017-03-21 ENCOUNTER — RX RENEWAL (OUTPATIENT)
Age: 52
End: 2017-03-21

## 2017-03-29 ENCOUNTER — OUTPATIENT (OUTPATIENT)
Dept: OUTPATIENT SERVICES | Facility: HOSPITAL | Age: 52
LOS: 1 days | Discharge: HOME | End: 2017-03-29

## 2017-03-29 DIAGNOSIS — Z98.89 OTHER SPECIFIED POSTPROCEDURAL STATES: Chronic | ICD-10-CM

## 2017-03-29 DIAGNOSIS — C49.9 MALIGNANT NEOPLASM OF CONNECTIVE AND SOFT TISSUE, UNSPECIFIED: ICD-10-CM

## 2017-03-29 DIAGNOSIS — Z90.721 ACQUIRED ABSENCE OF OVARIES, UNILATERAL: Chronic | ICD-10-CM

## 2017-03-29 DIAGNOSIS — C49.9 MALIGNANT NEOPLASM OF CONNECTIVE AND SOFT TISSUE, UNSPECIFIED: Chronic | ICD-10-CM

## 2017-03-30 ENCOUNTER — APPOINTMENT (OUTPATIENT)
Dept: HEMATOLOGY ONCOLOGY | Facility: CLINIC | Age: 52
End: 2017-03-30

## 2017-03-30 VITALS
HEART RATE: 87 BPM | TEMPERATURE: 98.2 F | DIASTOLIC BLOOD PRESSURE: 70 MMHG | RESPIRATION RATE: 16 BRPM | OXYGEN SATURATION: 99 % | SYSTOLIC BLOOD PRESSURE: 110 MMHG | BODY MASS INDEX: 23.33 KG/M2 | WEIGHT: 123.46 LBS

## 2017-04-18 ENCOUNTER — RX RENEWAL (OUTPATIENT)
Age: 52
End: 2017-04-18

## 2017-05-16 ENCOUNTER — APPOINTMENT (OUTPATIENT)
Dept: SURGICAL ONCOLOGY | Facility: CLINIC | Age: 52
End: 2017-05-16

## 2017-05-16 VITALS
HEIGHT: 61 IN | SYSTOLIC BLOOD PRESSURE: 131 MMHG | WEIGHT: 125 LBS | HEART RATE: 83 BPM | RESPIRATION RATE: 15 BRPM | DIASTOLIC BLOOD PRESSURE: 81 MMHG | BODY MASS INDEX: 23.6 KG/M2

## 2017-05-30 ENCOUNTER — FORM ENCOUNTER (OUTPATIENT)
Age: 52
End: 2017-05-30

## 2017-05-31 ENCOUNTER — APPOINTMENT (OUTPATIENT)
Dept: MRI IMAGING | Facility: CLINIC | Age: 52
End: 2017-05-31

## 2017-05-31 ENCOUNTER — OUTPATIENT (OUTPATIENT)
Dept: OUTPATIENT SERVICES | Facility: HOSPITAL | Age: 52
LOS: 1 days | End: 2017-05-31
Payer: COMMERCIAL

## 2017-05-31 DIAGNOSIS — Z98.89 OTHER SPECIFIED POSTPROCEDURAL STATES: Chronic | ICD-10-CM

## 2017-05-31 DIAGNOSIS — Z90.721 ACQUIRED ABSENCE OF OVARIES, UNILATERAL: Chronic | ICD-10-CM

## 2017-05-31 DIAGNOSIS — C49.9 MALIGNANT NEOPLASM OF CONNECTIVE AND SOFT TISSUE, UNSPECIFIED: Chronic | ICD-10-CM

## 2017-05-31 DIAGNOSIS — C49.9 MALIGNANT NEOPLASM OF CONNECTIVE AND SOFT TISSUE, UNSPECIFIED: ICD-10-CM

## 2017-05-31 PROCEDURE — A9585: CPT

## 2017-05-31 PROCEDURE — 72197 MRI PELVIS W/O & W/DYE: CPT

## 2017-06-01 ENCOUNTER — RX RENEWAL (OUTPATIENT)
Age: 52
End: 2017-06-01

## 2017-06-29 ENCOUNTER — RX RENEWAL (OUTPATIENT)
Age: 52
End: 2017-06-29

## 2017-08-03 ENCOUNTER — RX RENEWAL (OUTPATIENT)
Age: 52
End: 2017-08-03

## 2017-08-08 ENCOUNTER — OUTPATIENT (OUTPATIENT)
Dept: OUTPATIENT SERVICES | Facility: HOSPITAL | Age: 52
LOS: 1 days | Discharge: HOME | End: 2017-08-08

## 2017-08-08 DIAGNOSIS — Z98.89 OTHER SPECIFIED POSTPROCEDURAL STATES: Chronic | ICD-10-CM

## 2017-08-08 DIAGNOSIS — C49.9 MALIGNANT NEOPLASM OF CONNECTIVE AND SOFT TISSUE, UNSPECIFIED: ICD-10-CM

## 2017-08-08 DIAGNOSIS — Z90.721 ACQUIRED ABSENCE OF OVARIES, UNILATERAL: Chronic | ICD-10-CM

## 2017-08-08 DIAGNOSIS — C49.9 MALIGNANT NEOPLASM OF CONNECTIVE AND SOFT TISSUE, UNSPECIFIED: Chronic | ICD-10-CM

## 2017-08-10 ENCOUNTER — APPOINTMENT (OUTPATIENT)
Dept: HEMATOLOGY ONCOLOGY | Facility: CLINIC | Age: 52
End: 2017-08-10
Payer: COMMERCIAL

## 2017-08-10 VITALS
BODY MASS INDEX: 24.99 KG/M2 | SYSTOLIC BLOOD PRESSURE: 134 MMHG | HEART RATE: 79 BPM | DIASTOLIC BLOOD PRESSURE: 80 MMHG | TEMPERATURE: 98.3 F | WEIGHT: 132.28 LBS | OXYGEN SATURATION: 98 % | RESPIRATION RATE: 16 BRPM

## 2017-08-10 PROCEDURE — 99215 OFFICE O/P EST HI 40 MIN: CPT

## 2017-09-08 ENCOUNTER — MEDICATION RENEWAL (OUTPATIENT)
Age: 52
End: 2017-09-08

## 2017-10-09 ENCOUNTER — RESULT REVIEW (OUTPATIENT)
Age: 52
End: 2017-10-09

## 2017-10-12 ENCOUNTER — RX RENEWAL (OUTPATIENT)
Age: 52
End: 2017-10-12

## 2017-10-16 ENCOUNTER — OTHER (OUTPATIENT)
Age: 52
End: 2017-10-16

## 2017-10-23 ENCOUNTER — FORM ENCOUNTER (OUTPATIENT)
Age: 52
End: 2017-10-23

## 2017-10-24 ENCOUNTER — OUTPATIENT (OUTPATIENT)
Dept: OUTPATIENT SERVICES | Facility: HOSPITAL | Age: 52
LOS: 1 days | End: 2017-10-24
Payer: COMMERCIAL

## 2017-10-24 ENCOUNTER — APPOINTMENT (OUTPATIENT)
Dept: CT IMAGING | Facility: CLINIC | Age: 52
End: 2017-10-24

## 2017-10-24 DIAGNOSIS — Z98.89 OTHER SPECIFIED POSTPROCEDURAL STATES: Chronic | ICD-10-CM

## 2017-10-24 DIAGNOSIS — Z00.8 ENCOUNTER FOR OTHER GENERAL EXAMINATION: ICD-10-CM

## 2017-10-24 DIAGNOSIS — C49.9 MALIGNANT NEOPLASM OF CONNECTIVE AND SOFT TISSUE, UNSPECIFIED: Chronic | ICD-10-CM

## 2017-10-24 DIAGNOSIS — Z90.721 ACQUIRED ABSENCE OF OVARIES, UNILATERAL: Chronic | ICD-10-CM

## 2017-10-24 PROCEDURE — 74177 CT ABD & PELVIS W/CONTRAST: CPT | Mod: 26

## 2017-10-24 PROCEDURE — 74177 CT ABD & PELVIS W/CONTRAST: CPT

## 2017-10-26 ENCOUNTER — APPOINTMENT (OUTPATIENT)
Dept: SURGICAL ONCOLOGY | Facility: CLINIC | Age: 52
End: 2017-10-26
Payer: COMMERCIAL

## 2017-10-26 VITALS
SYSTOLIC BLOOD PRESSURE: 135 MMHG | WEIGHT: 130 LBS | DIASTOLIC BLOOD PRESSURE: 83 MMHG | HEART RATE: 82 BPM | BODY MASS INDEX: 24.55 KG/M2 | RESPIRATION RATE: 15 BRPM | HEIGHT: 61 IN

## 2017-10-26 DIAGNOSIS — K56.50 INTESTINAL ADHESIONS [BANDS], UNSPECIFIED AS TO PARTIAL VERSUS COMPLETE OBSTRUCTION: ICD-10-CM

## 2017-10-26 PROCEDURE — 99215 OFFICE O/P EST HI 40 MIN: CPT

## 2017-11-05 ENCOUNTER — FORM ENCOUNTER (OUTPATIENT)
Age: 52
End: 2017-11-05

## 2017-11-06 ENCOUNTER — OUTPATIENT (OUTPATIENT)
Dept: OUTPATIENT SERVICES | Facility: HOSPITAL | Age: 52
LOS: 1 days | End: 2017-11-06
Payer: COMMERCIAL

## 2017-11-06 ENCOUNTER — APPOINTMENT (OUTPATIENT)
Dept: MRI IMAGING | Facility: CLINIC | Age: 52
End: 2017-11-06

## 2017-11-06 DIAGNOSIS — Z98.89 OTHER SPECIFIED POSTPROCEDURAL STATES: Chronic | ICD-10-CM

## 2017-11-06 DIAGNOSIS — Z00.8 ENCOUNTER FOR OTHER GENERAL EXAMINATION: ICD-10-CM

## 2017-11-06 DIAGNOSIS — Z90.721 ACQUIRED ABSENCE OF OVARIES, UNILATERAL: Chronic | ICD-10-CM

## 2017-11-06 DIAGNOSIS — C49.9 MALIGNANT NEOPLASM OF CONNECTIVE AND SOFT TISSUE, UNSPECIFIED: Chronic | ICD-10-CM

## 2017-11-06 PROCEDURE — A9585: CPT

## 2017-11-06 PROCEDURE — 73723 MRI JOINT LWR EXTR W/O&W/DYE: CPT

## 2017-11-06 PROCEDURE — 73723 MRI JOINT LWR EXTR W/O&W/DYE: CPT | Mod: 26,RT

## 2017-11-16 ENCOUNTER — RX RENEWAL (OUTPATIENT)
Age: 52
End: 2017-11-16

## 2017-11-27 ENCOUNTER — OUTPATIENT (OUTPATIENT)
Dept: OUTPATIENT SERVICES | Facility: HOSPITAL | Age: 52
LOS: 1 days | Discharge: HOME | End: 2017-11-27

## 2017-11-27 DIAGNOSIS — Z98.89 OTHER SPECIFIED POSTPROCEDURAL STATES: Chronic | ICD-10-CM

## 2017-11-27 DIAGNOSIS — Z90.721 ACQUIRED ABSENCE OF OVARIES, UNILATERAL: Chronic | ICD-10-CM

## 2017-11-27 DIAGNOSIS — C49.9 MALIGNANT NEOPLASM OF CONNECTIVE AND SOFT TISSUE, UNSPECIFIED: Chronic | ICD-10-CM

## 2017-11-27 DIAGNOSIS — C49.9 MALIGNANT NEOPLASM OF CONNECTIVE AND SOFT TISSUE, UNSPECIFIED: ICD-10-CM

## 2017-12-01 ENCOUNTER — APPOINTMENT (OUTPATIENT)
Dept: HEMATOLOGY ONCOLOGY | Facility: CLINIC | Age: 52
End: 2017-12-01
Payer: COMMERCIAL

## 2017-12-01 VITALS
BODY MASS INDEX: 26.49 KG/M2 | HEART RATE: 71 BPM | WEIGHT: 140.21 LBS | TEMPERATURE: 98.3 F | SYSTOLIC BLOOD PRESSURE: 106 MMHG | OXYGEN SATURATION: 99 % | DIASTOLIC BLOOD PRESSURE: 63 MMHG | RESPIRATION RATE: 16 BRPM

## 2017-12-01 PROCEDURE — 99215 OFFICE O/P EST HI 40 MIN: CPT

## 2017-12-04 NOTE — H&P PST ADULT - NEUROLOGICAL
Pt reports \"I believe that I am pregnant\" states she had bleeding for 2 days but bleeding has stopped. Pt requesting pregnancy test. Denies any current abd pain but states she has had some cramping.    negative detailed exam

## 2017-12-19 ENCOUNTER — RX RENEWAL (OUTPATIENT)
Age: 52
End: 2017-12-19

## 2018-01-16 ENCOUNTER — RX RENEWAL (OUTPATIENT)
Age: 53
End: 2018-01-16

## 2018-02-16 ENCOUNTER — RX RENEWAL (OUTPATIENT)
Age: 53
End: 2018-02-16

## 2018-03-15 ENCOUNTER — MEDICATION RENEWAL (OUTPATIENT)
Age: 53
End: 2018-03-15

## 2018-04-11 ENCOUNTER — RX RENEWAL (OUTPATIENT)
Age: 53
End: 2018-04-11

## 2018-05-08 ENCOUNTER — OUTPATIENT (OUTPATIENT)
Dept: OUTPATIENT SERVICES | Facility: HOSPITAL | Age: 53
LOS: 1 days | Discharge: HOME | End: 2018-05-08

## 2018-05-08 ENCOUNTER — APPOINTMENT (OUTPATIENT)
Dept: SURGICAL ONCOLOGY | Facility: CLINIC | Age: 53
End: 2018-05-08
Payer: COMMERCIAL

## 2018-05-08 VITALS
DIASTOLIC BLOOD PRESSURE: 79 MMHG | WEIGHT: 140 LBS | BODY MASS INDEX: 26.43 KG/M2 | HEIGHT: 61 IN | HEART RATE: 81 BPM | RESPIRATION RATE: 14 BRPM | SYSTOLIC BLOOD PRESSURE: 121 MMHG

## 2018-05-08 DIAGNOSIS — Z98.89 OTHER SPECIFIED POSTPROCEDURAL STATES: Chronic | ICD-10-CM

## 2018-05-08 DIAGNOSIS — C49.9 MALIGNANT NEOPLASM OF CONNECTIVE AND SOFT TISSUE, UNSPECIFIED: ICD-10-CM

## 2018-05-08 DIAGNOSIS — C49.9 MALIGNANT NEOPLASM OF CONNECTIVE AND SOFT TISSUE, UNSPECIFIED: Chronic | ICD-10-CM

## 2018-05-08 DIAGNOSIS — Z90.721 ACQUIRED ABSENCE OF OVARIES, UNILATERAL: Chronic | ICD-10-CM

## 2018-05-08 PROCEDURE — 99214 OFFICE O/P EST MOD 30 MIN: CPT

## 2018-05-10 ENCOUNTER — APPOINTMENT (OUTPATIENT)
Dept: HEMATOLOGY ONCOLOGY | Facility: CLINIC | Age: 53
End: 2018-05-10
Payer: COMMERCIAL

## 2018-05-10 VITALS
HEART RATE: 70 BPM | BODY MASS INDEX: 26.78 KG/M2 | OXYGEN SATURATION: 98 % | WEIGHT: 141.76 LBS | DIASTOLIC BLOOD PRESSURE: 73 MMHG | RESPIRATION RATE: 16 BRPM | SYSTOLIC BLOOD PRESSURE: 115 MMHG | TEMPERATURE: 97.8 F

## 2018-05-10 PROCEDURE — 99214 OFFICE O/P EST MOD 30 MIN: CPT

## 2018-05-28 ENCOUNTER — FORM ENCOUNTER (OUTPATIENT)
Age: 53
End: 2018-05-28

## 2018-05-29 ENCOUNTER — APPOINTMENT (OUTPATIENT)
Dept: CT IMAGING | Facility: CLINIC | Age: 53
End: 2018-05-29
Payer: COMMERCIAL

## 2018-05-29 ENCOUNTER — OUTPATIENT (OUTPATIENT)
Dept: OUTPATIENT SERVICES | Facility: HOSPITAL | Age: 53
LOS: 1 days | End: 2018-05-29
Payer: COMMERCIAL

## 2018-05-29 ENCOUNTER — APPOINTMENT (OUTPATIENT)
Dept: MRI IMAGING | Facility: CLINIC | Age: 53
End: 2018-05-29
Payer: COMMERCIAL

## 2018-05-29 DIAGNOSIS — Z98.89 OTHER SPECIFIED POSTPROCEDURAL STATES: Chronic | ICD-10-CM

## 2018-05-29 DIAGNOSIS — Z90.721 ACQUIRED ABSENCE OF OVARIES, UNILATERAL: Chronic | ICD-10-CM

## 2018-05-29 DIAGNOSIS — C49.9 MALIGNANT NEOPLASM OF CONNECTIVE AND SOFT TISSUE, UNSPECIFIED: Chronic | ICD-10-CM

## 2018-05-29 DIAGNOSIS — Z00.8 ENCOUNTER FOR OTHER GENERAL EXAMINATION: ICD-10-CM

## 2018-05-29 PROCEDURE — 71250 CT THORAX DX C-: CPT | Mod: 26

## 2018-05-29 PROCEDURE — 71250 CT THORAX DX C-: CPT

## 2018-05-29 PROCEDURE — 73723 MRI JOINT LWR EXTR W/O&W/DYE: CPT | Mod: 26,RT

## 2018-05-29 PROCEDURE — A9585: CPT

## 2018-05-29 PROCEDURE — 73723 MRI JOINT LWR EXTR W/O&W/DYE: CPT

## 2018-06-06 ENCOUNTER — RX RENEWAL (OUTPATIENT)
Age: 53
End: 2018-06-06

## 2018-07-03 ENCOUNTER — RX RENEWAL (OUTPATIENT)
Age: 53
End: 2018-07-03

## 2018-07-05 ENCOUNTER — RX RENEWAL (OUTPATIENT)
Age: 53
End: 2018-07-05

## 2018-07-31 ENCOUNTER — MEDICATION RENEWAL (OUTPATIENT)
Age: 53
End: 2018-07-31

## 2018-08-01 ENCOUNTER — RX RENEWAL (OUTPATIENT)
Age: 53
End: 2018-08-01

## 2018-09-05 ENCOUNTER — RX RENEWAL (OUTPATIENT)
Age: 53
End: 2018-09-05

## 2018-10-01 ENCOUNTER — OUTPATIENT (OUTPATIENT)
Dept: OUTPATIENT SERVICES | Facility: HOSPITAL | Age: 53
LOS: 1 days | Discharge: HOME | End: 2018-10-01

## 2018-10-01 ENCOUNTER — APPOINTMENT (OUTPATIENT)
Dept: HEMATOLOGY ONCOLOGY | Facility: CLINIC | Age: 53
End: 2018-10-01
Payer: COMMERCIAL

## 2018-10-01 VITALS
HEART RATE: 80 BPM | WEIGHT: 143.3 LBS | TEMPERATURE: 98.9 F | BODY MASS INDEX: 27.08 KG/M2 | SYSTOLIC BLOOD PRESSURE: 114 MMHG | RESPIRATION RATE: 16 BRPM | OXYGEN SATURATION: 99 % | DIASTOLIC BLOOD PRESSURE: 77 MMHG

## 2018-10-01 DIAGNOSIS — Z98.89 OTHER SPECIFIED POSTPROCEDURAL STATES: Chronic | ICD-10-CM

## 2018-10-01 DIAGNOSIS — Z90.721 ACQUIRED ABSENCE OF OVARIES, UNILATERAL: Chronic | ICD-10-CM

## 2018-10-01 DIAGNOSIS — C49.9 MALIGNANT NEOPLASM OF CONNECTIVE AND SOFT TISSUE, UNSPECIFIED: Chronic | ICD-10-CM

## 2018-10-01 DIAGNOSIS — C49.9 MALIGNANT NEOPLASM OF CONNECTIVE AND SOFT TISSUE, UNSPECIFIED: ICD-10-CM

## 2018-10-01 PROCEDURE — 99214 OFFICE O/P EST MOD 30 MIN: CPT

## 2018-10-04 ENCOUNTER — RX RENEWAL (OUTPATIENT)
Age: 53
End: 2018-10-04

## 2018-11-06 ENCOUNTER — RX RENEWAL (OUTPATIENT)
Age: 53
End: 2018-11-06

## 2018-12-06 ENCOUNTER — RX RENEWAL (OUTPATIENT)
Age: 53
End: 2018-12-06

## 2019-01-03 ENCOUNTER — RX RENEWAL (OUTPATIENT)
Age: 54
End: 2019-01-03

## 2019-02-03 ENCOUNTER — FORM ENCOUNTER (OUTPATIENT)
Age: 54
End: 2019-02-03

## 2019-02-04 ENCOUNTER — APPOINTMENT (OUTPATIENT)
Dept: MRI IMAGING | Facility: CLINIC | Age: 54
End: 2019-02-04

## 2019-02-04 ENCOUNTER — APPOINTMENT (OUTPATIENT)
Dept: CT IMAGING | Facility: CLINIC | Age: 54
End: 2019-02-04

## 2019-02-04 ENCOUNTER — OUTPATIENT (OUTPATIENT)
Dept: OUTPATIENT SERVICES | Facility: HOSPITAL | Age: 54
LOS: 1 days | End: 2019-02-04
Payer: COMMERCIAL

## 2019-02-04 DIAGNOSIS — Z00.8 ENCOUNTER FOR OTHER GENERAL EXAMINATION: ICD-10-CM

## 2019-02-04 DIAGNOSIS — Z98.89 OTHER SPECIFIED POSTPROCEDURAL STATES: Chronic | ICD-10-CM

## 2019-02-04 DIAGNOSIS — Z90.721 ACQUIRED ABSENCE OF OVARIES, UNILATERAL: Chronic | ICD-10-CM

## 2019-02-04 DIAGNOSIS — C49.9 MALIGNANT NEOPLASM OF CONNECTIVE AND SOFT TISSUE, UNSPECIFIED: Chronic | ICD-10-CM

## 2019-02-04 PROCEDURE — 71250 CT THORAX DX C-: CPT

## 2019-02-04 PROCEDURE — 73723 MRI JOINT LWR EXTR W/O&W/DYE: CPT

## 2019-02-04 PROCEDURE — A9585: CPT

## 2019-02-04 PROCEDURE — 73723 MRI JOINT LWR EXTR W/O&W/DYE: CPT | Mod: 26,RT

## 2019-02-04 PROCEDURE — 71250 CT THORAX DX C-: CPT | Mod: 26

## 2019-02-06 ENCOUNTER — RX RENEWAL (OUTPATIENT)
Age: 54
End: 2019-02-06

## 2019-03-05 ENCOUNTER — APPOINTMENT (OUTPATIENT)
Dept: SURGICAL ONCOLOGY | Facility: CLINIC | Age: 54
End: 2019-03-05
Payer: COMMERCIAL

## 2019-03-05 VITALS
HEART RATE: 79 BPM | DIASTOLIC BLOOD PRESSURE: 79 MMHG | BODY MASS INDEX: 26.43 KG/M2 | OXYGEN SATURATION: 98 % | WEIGHT: 140 LBS | HEIGHT: 61 IN | SYSTOLIC BLOOD PRESSURE: 117 MMHG

## 2019-03-05 DIAGNOSIS — D48.1 NEOPLASM OF UNCERTAIN BEHAVIOR OF CONNECTIVE AND OTHER SOFT TISSUE: ICD-10-CM

## 2019-03-05 PROCEDURE — 99213 OFFICE O/P EST LOW 20 MIN: CPT

## 2019-03-07 ENCOUNTER — RX RENEWAL (OUTPATIENT)
Age: 54
End: 2019-03-07

## 2019-03-07 NOTE — PHYSICAL EXAM
[FreeTextEntry1] : Right lower abdominal wall extending to upper thigh with post-radiation changes.  Area firm to palpation without obvious mass.  Incision healed.  No palpable adenopathy.\par Abdomen otherwise soft and nontender/nondistended. Incisions healed. [Normal] : supple, no neck mass and thyroid not enlarged [Normal Neck Lymph Nodes] : normal neck lymph nodes  [Normal Supraclavicular Lymph Nodes] : normal supraclavicular lymph nodes [Normal Groin Lymph Nodes] : normal groin lymph nodes [Normal Axillary Lymph Nodes] : normal axillary lymph nodes [Normal] : oriented to person, place and time, with appropriate affect

## 2019-03-07 NOTE — HISTORY OF PRESENT ILLNESS
[de-identified] : 51 y/o female presents for follow up of right groin recurrent sarcoma.  S/p radical resection/right superficial inguinal lymph node dissection and abdominal wall reconstruction 10/17/2014.  Received adjuvant radiation therapy.\par \par Patient feels well. Minimal pain at operative site.  Stable swelling right lower extremity.  Denies drainage or infection of operative site.  Most recent imaging 02/2016. MRI shows stable post-operative changes.  CT chest is without new or suspicious findings.\par \par 06/09/2016: recently admitted to [facility] for adhesive SBO treated successfully with nonoperative management.  Feels well, but reports recurrent self limiting episode of signs and symptoms consistent with SBO.  Currently tolerating diet.  Reports similar episodes over last 3 months prior to admission.  Otherwise denies abdominal pain.  Passing flatus.\par \par 02/28/2017: Patient is s/p laparoscopic assisted SBR for recurrent partial SBO.  Denies pain.  Tolerating diet without nausea or vomiting.\par \par 05/16/2017:  Patient presents for follow up of recurrent right groin sarcoma and small bowel obstruction.  She is s/p laparoscopic SBR for recurrent SBO - intraoperative findings were consistent with radiation thickening and stricture.  She is doing well from SBR.  She denies previously noted abdominal pain and bloating.  Denies nausea or vomiting.  Her appetite is good and reports regular bowel movements.  However, she recently began experiencing increasing pain at right groin sarcoma resection site without associated symptoms of infection, swelling or pain radiating to right leg.  Denies noticeable swelling or mass in right groin region.  Previous imaging was 01/2017 which did not show recurrent disease.\par \par 10/26/2017:  Patient presents for 6 months follow up.  She reports recently chronic right inguinal pain has slightly worsened.  She is eating well, without nausea or emesis.  She reports regular bowel movements.  I sent her for CT abdomen/pelvis evaluation 10/24/2017 which did not revealed intra-abdominal pathology or evidence of recurrent soft tissue sarcoma in right groin region.\par \par 05/08/2018:   Patient presents for 6 months follow up.  She reports recently chronic right inguinal pain has slightly worsened.  She is eating well, without nausea or emesis.  She reports regular bowel movements.  She had a follow up right hip MRI 11/06/2017 which did not reveal evidence of recurrent disease.  Her pain is managed by Dr. Mercado.\par \par 03/05/2019:  Presenting for follow up.  She reports doing well and is without new symptoms.  Recent surveillance CT chest and MRI of right hip show no evidence of recurrent or metastatic disease.  She denies obstructive symptoms.

## 2019-03-07 NOTE — ASSESSMENT
[FreeTextEntry1] : Doing well.\par LUC.\par \par Plan:  Repeat CT chest and MRI right hip in 6 months to complete 5 year surveillance.  Likely to transition to annual imaging at that time.  Follow up in 6 months.

## 2019-03-18 ENCOUNTER — OUTPATIENT (OUTPATIENT)
Dept: OUTPATIENT SERVICES | Facility: HOSPITAL | Age: 54
LOS: 1 days | Discharge: HOME | End: 2019-03-18

## 2019-03-18 DIAGNOSIS — Z98.89 OTHER SPECIFIED POSTPROCEDURAL STATES: Chronic | ICD-10-CM

## 2019-03-18 DIAGNOSIS — Z90.721 ACQUIRED ABSENCE OF OVARIES, UNILATERAL: Chronic | ICD-10-CM

## 2019-03-18 DIAGNOSIS — C49.9 MALIGNANT NEOPLASM OF CONNECTIVE AND SOFT TISSUE, UNSPECIFIED: ICD-10-CM

## 2019-03-18 DIAGNOSIS — C49.9 MALIGNANT NEOPLASM OF CONNECTIVE AND SOFT TISSUE, UNSPECIFIED: Chronic | ICD-10-CM

## 2019-03-22 ENCOUNTER — APPOINTMENT (OUTPATIENT)
Dept: HEMATOLOGY ONCOLOGY | Facility: CLINIC | Age: 54
End: 2019-03-22
Payer: COMMERCIAL

## 2019-03-22 VITALS
WEIGHT: 140 LBS | DIASTOLIC BLOOD PRESSURE: 75 MMHG | BODY MASS INDEX: 26.45 KG/M2 | SYSTOLIC BLOOD PRESSURE: 113 MMHG | OXYGEN SATURATION: 99 % | HEART RATE: 79 BPM | RESPIRATION RATE: 24 BRPM | TEMPERATURE: 97.4 F

## 2019-03-22 PROCEDURE — 99214 OFFICE O/P EST MOD 30 MIN: CPT

## 2019-03-30 NOTE — PHYSICAL EXAM
[Ambulatory and capable of all self care but unable to carry out any work activities] : Status 2- Ambulatory and capable of all self care but unable to carry out any work activities. Up and about more than 50% of waking hours [Normal] : affect appropriate [de-identified] : r groin indurated and hyperpigmented/RT fibrosis.

## 2019-03-30 NOTE — HISTORY OF PRESENT ILLNESS
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 Nevertheless she still has pain in this area. She states that the obesity really helps her in for a brief period for 2-3 hours as ibuprofen. Oxycodone has a minimal effect. [de-identified] : Routine and structured visit for CRPS r groin.Doing "OK" pain controlled w low dose Oxycodone for extensive CRPS of r groin, which is permanently indurated and sore

## 2019-03-30 NOTE — ASSESSMENT
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 RTC 3 mos.Aggressive laxative protocol emphasized [Supportive] : Goals of care discussed with patient: Supportive [Palliative Care Plan] : not applicable at this time

## 2019-04-17 ENCOUNTER — RX RENEWAL (OUTPATIENT)
Age: 54
End: 2019-04-17

## 2019-04-25 ENCOUNTER — TRANSCRIPTION ENCOUNTER (OUTPATIENT)
Age: 54
End: 2019-04-25

## 2019-05-15 ENCOUNTER — RX RENEWAL (OUTPATIENT)
Age: 54
End: 2019-05-15

## 2019-06-13 ENCOUNTER — RX RENEWAL (OUTPATIENT)
Age: 54
End: 2019-06-13

## 2019-07-11 ENCOUNTER — RX RENEWAL (OUTPATIENT)
Age: 54
End: 2019-07-11

## 2019-08-07 ENCOUNTER — RX RENEWAL (OUTPATIENT)
Age: 54
End: 2019-08-07

## 2019-08-08 ENCOUNTER — RX RENEWAL (OUTPATIENT)
Age: 54
End: 2019-08-08

## 2019-08-27 ENCOUNTER — FORM ENCOUNTER (OUTPATIENT)
Age: 54
End: 2019-08-27

## 2019-08-28 ENCOUNTER — APPOINTMENT (OUTPATIENT)
Dept: CT IMAGING | Facility: CLINIC | Age: 54
End: 2019-08-28

## 2019-08-28 ENCOUNTER — OUTPATIENT (OUTPATIENT)
Dept: OUTPATIENT SERVICES | Facility: HOSPITAL | Age: 54
LOS: 1 days | End: 2019-08-28
Payer: COMMERCIAL

## 2019-08-28 ENCOUNTER — APPOINTMENT (OUTPATIENT)
Dept: MRI IMAGING | Facility: CLINIC | Age: 54
End: 2019-08-28

## 2019-08-28 DIAGNOSIS — Z98.89 OTHER SPECIFIED POSTPROCEDURAL STATES: Chronic | ICD-10-CM

## 2019-08-28 DIAGNOSIS — C49.9 MALIGNANT NEOPLASM OF CONNECTIVE AND SOFT TISSUE, UNSPECIFIED: Chronic | ICD-10-CM

## 2019-08-28 DIAGNOSIS — Z00.8 ENCOUNTER FOR OTHER GENERAL EXAMINATION: ICD-10-CM

## 2019-08-28 DIAGNOSIS — Z90.721 ACQUIRED ABSENCE OF OVARIES, UNILATERAL: Chronic | ICD-10-CM

## 2019-08-28 PROCEDURE — 73723 MRI JOINT LWR EXTR W/O&W/DYE: CPT

## 2019-08-28 PROCEDURE — 71250 CT THORAX DX C-: CPT

## 2019-08-28 PROCEDURE — A9585: CPT

## 2019-08-28 PROCEDURE — 71250 CT THORAX DX C-: CPT | Mod: 26

## 2019-08-28 PROCEDURE — 73723 MRI JOINT LWR EXTR W/O&W/DYE: CPT | Mod: 26,RT

## 2019-09-07 ENCOUNTER — OUTPATIENT (OUTPATIENT)
Dept: OUTPATIENT SERVICES | Facility: HOSPITAL | Age: 54
LOS: 1 days | Discharge: HOME | End: 2019-09-07

## 2019-09-07 DIAGNOSIS — C49.9 MALIGNANT NEOPLASM OF CONNECTIVE AND SOFT TISSUE, UNSPECIFIED: Chronic | ICD-10-CM

## 2019-09-07 DIAGNOSIS — Z98.89 OTHER SPECIFIED POSTPROCEDURAL STATES: Chronic | ICD-10-CM

## 2019-09-07 DIAGNOSIS — Z90.721 ACQUIRED ABSENCE OF OVARIES, UNILATERAL: Chronic | ICD-10-CM

## 2019-09-07 DIAGNOSIS — C49.9 MALIGNANT NEOPLASM OF CONNECTIVE AND SOFT TISSUE, UNSPECIFIED: ICD-10-CM

## 2019-09-09 ENCOUNTER — APPOINTMENT (OUTPATIENT)
Dept: HEMATOLOGY ONCOLOGY | Facility: CLINIC | Age: 54
End: 2019-09-09
Payer: COMMERCIAL

## 2019-09-09 VITALS
OXYGEN SATURATION: 99 % | HEART RATE: 72 BPM | RESPIRATION RATE: 18 BRPM | DIASTOLIC BLOOD PRESSURE: 67 MMHG | WEIGHT: 146.14 LBS | BODY MASS INDEX: 27.62 KG/M2 | TEMPERATURE: 98.1 F | SYSTOLIC BLOOD PRESSURE: 104 MMHG

## 2019-09-09 PROCEDURE — 99214 OFFICE O/P EST MOD 30 MIN: CPT

## 2019-09-10 ENCOUNTER — APPOINTMENT (OUTPATIENT)
Dept: SURGICAL ONCOLOGY | Facility: CLINIC | Age: 54
End: 2019-09-10
Payer: COMMERCIAL

## 2019-09-10 VITALS
DIASTOLIC BLOOD PRESSURE: 75 MMHG | HEART RATE: 76 BPM | RESPIRATION RATE: 15 BRPM | HEIGHT: 61 IN | SYSTOLIC BLOOD PRESSURE: 116 MMHG | BODY MASS INDEX: 27.56 KG/M2 | WEIGHT: 146 LBS

## 2019-09-10 PROCEDURE — 99213 OFFICE O/P EST LOW 20 MIN: CPT

## 2019-09-11 NOTE — ASSESSMENT
[FreeTextEntry1] : LUC nearly 5 years.\par \par Plan: Continue with right hip MRI every 6 months.  Will transition CT chest to annual exam.  Next exam in 6 months.

## 2019-09-11 NOTE — HISTORY OF PRESENT ILLNESS
[de-identified] : 49 y/o female presents for follow up of right groin recurrent sarcoma.  S/p radical resection/right superficial inguinal lymph node dissection and abdominal wall reconstruction 10/17/2014.  Received adjuvant radiation therapy.\par \par Patient feels well. Minimal pain at operative site.  Stable swelling right lower extremity.  Denies drainage or infection of operative site.  Most recent imaging 02/2016. MRI shows stable post-operative changes.  CT chest is without new or suspicious findings.\par \par 06/09/2016: recently admitted to [facility] for adhesive SBO treated successfully with nonoperative management.  Feels well, but reports recurrent self limiting episode of signs and symptoms consistent with SBO.  Currently tolerating diet.  Reports similar episodes over last 3 months prior to admission.  Otherwise denies abdominal pain.  Passing flatus.\par \par 02/28/2017: Patient is s/p laparoscopic assisted SBR for recurrent partial SBO.  Denies pain.  Tolerating diet without nausea or vomiting.\par \par 05/16/2017:  Patient presents for follow up of recurrent right groin sarcoma and small bowel obstruction.  She is s/p laparoscopic SBR for recurrent SBO - intraoperative findings were consistent with radiation thickening and stricture.  She is doing well from SBR.  She denies previously noted abdominal pain and bloating.  Denies nausea or vomiting.  Her appetite is good and reports regular bowel movements.  However, she recently began experiencing increasing pain at right groin sarcoma resection site without associated symptoms of infection, swelling or pain radiating to right leg.  Denies noticeable swelling or mass in right groin region.  Previous imaging was 01/2017 which did not show recurrent disease.\par \par 10/26/2017:  Patient presents for 6 months follow up.  She reports recently chronic right inguinal pain has slightly worsened.  She is eating well, without nausea or emesis.  She reports regular bowel movements.  I sent her for CT abdomen/pelvis evaluation 10/24/2017 which did not revealed intra-abdominal pathology or evidence of recurrent soft tissue sarcoma in right groin region.\par \par 05/08/2018:   Patient presents for 6 months follow up.  She reports recently chronic right inguinal pain has slightly worsened.  She is eating well, without nausea or emesis.  She reports regular bowel movements.  She had a follow up right hip MRI 11/06/2017 which did not reveal evidence of recurrent disease.  Her pain is managed by Dr. Mercado.\par \par 03/05/2019:  Presenting for follow up.  She reports doing well and is without new symptoms.  Recent surveillance CT chest and MRI of right hip show no evidence of recurrent or metastatic disease.  She denies obstructive symptoms.\par \par 09/10/2019: Patient presents for routine follow up.  She reports no clinical change compared to her last visit 6 months ago.  She denies symptoms of intestinal obstruction.  Her chronic right hip pain is well managed by Dr. Mercado.  Recent CT chest and right hip MRI 08/28/2019 do not demonstrate evidence of recurrent/metastatic disease.

## 2019-09-12 NOTE — PHYSICAL EXAM
[Ambulatory and capable of all self care but unable to carry out any work activities] : Status 2- Ambulatory and capable of all self care but unable to carry out any work activities. Up and about more than 50% of waking hours [Normal] : affect appropriate [de-identified] : r groin indurated and hyperpigmented/RT fibrosis.

## 2019-09-12 NOTE — ASSESSMENT
[Supportive] : Goals of care discussed with patient: Supportive [Palliative Care Plan] : not applicable at this time [FreeTextEntry1] : Mainly stable issues  re CRPS r groin from sarcoma and ext surgical intervention and RT.LUC but pain /functional status requires chronic opuioid based Rx in low dose.Pt w NO misuse or aberrant issues. RTC 3 mos.Aggressive laxative protocol emphasized.F/U w dr Lynch tomorrow

## 2019-09-12 NOTE — HISTORY OF PRESENT ILLNESS
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 Nevertheless she still has pain in this area. She states that the obesity really helps her in for a brief period for 2-3 hours as ibuprofen. Oxycodone has a minimal effect. [de-identified] : Routine and structured visit for CRPS r groin.Doing "OK" pain controlled w low dose Oxycodone for extensive CRPS of r groin, which is permanently indurated and sore'"swollen feeling " Recent MRI is LUC

## 2019-10-07 ENCOUNTER — RX RENEWAL (OUTPATIENT)
Age: 54
End: 2019-10-07

## 2019-11-07 ENCOUNTER — RX RENEWAL (OUTPATIENT)
Age: 54
End: 2019-11-07

## 2019-12-06 ENCOUNTER — RX RENEWAL (OUTPATIENT)
Age: 54
End: 2019-12-06

## 2019-12-13 ENCOUNTER — OUTPATIENT (OUTPATIENT)
Dept: OUTPATIENT SERVICES | Facility: HOSPITAL | Age: 54
LOS: 1 days | Discharge: HOME | End: 2019-12-13

## 2019-12-13 DIAGNOSIS — Z98.89 OTHER SPECIFIED POSTPROCEDURAL STATES: Chronic | ICD-10-CM

## 2019-12-13 DIAGNOSIS — Z90.721 ACQUIRED ABSENCE OF OVARIES, UNILATERAL: Chronic | ICD-10-CM

## 2019-12-13 DIAGNOSIS — C49.9 MALIGNANT NEOPLASM OF CONNECTIVE AND SOFT TISSUE, UNSPECIFIED: Chronic | ICD-10-CM

## 2019-12-13 DIAGNOSIS — C49.9 MALIGNANT NEOPLASM OF CONNECTIVE AND SOFT TISSUE, UNSPECIFIED: ICD-10-CM

## 2019-12-25 NOTE — PATIENT PROFILE ADULT. - [facility]
Speech Therapy  Clinical Swallow Evaluation     RECOMMENDATIONS:     1) General solids / thin liquids / straws ok (d/c straws if SS aspiration)  2) Meds whole one at a time with liquids (baseline)  3) Feeds self independently  4) On-going ST for swallow x1-2 additional sessions    Therapist reviewed recs with Meghan DEL CID.       ASSESSMENT:     The patient was seen on [address] for clinical swallow evaluation. Pt was alert and feeding himself when SLP arrived. Pt consumed fresh fruit cup, plus water via cup and straw sips. Single mild delayed throat clear occurred which may or may not have been related to snack trials; vocal quality remained clear. Pt denies having history of pneumonia, and CXR & CT / chest on admit were negative for infiltrates. See flowsheet below for eval details. Pt reports that he occasionally uses straws at home. SLP educated pt to d/c straws if coughing occurs. Continued skilled ST is required for on-going diet tolerance verification, and pt / family education.        Prior Communication/Cognition:  Prior Cognition: Intact  Prior Memory: Impaired  Additional Comments: See ST progress note for hospital course.       Baseline Diet:  Feeds Self: Yes  Liquids: Thin(occasional straws at home)  Solids : General  Additional Comments: See ST progress note for hospital course.     EDUCATION:   On this date, the patient and patient's spouse were educated regarding ST eval tasks and recs.  The response to education was: Verbalizes understanding, Demonstrates understanding and Needs reinforcement    Plan for Next Session:  Plan for Next Session: SWALLOW: D/c if tolerating general / thin. Hx of occasional cough with liquids but CXR, CT / chest, and RN reports of lung status negative for SS aspiration; pt denies hx of pneumonia.     Frequency:  Frequency: F sw (12/25)     Barriers to Discharge:   SLP Identified Barriers to Discharge: None    Recommendations for Discharge:  Recommendations for Discharge:  SLP: None (12/25/19 1025)    Subjective/Objective:  Clinical Swallow Eval:   Observation  Temperature Spikes Noted: No  Oxygen Needs: Room air  History of Intubation: No  Vision: Functional for self-feeding  Patient Positioning: Upright in bed  Pre-trial Vocal Quality: Normal  Volitional Swallow: Present  Observation Comments: Independent with self feeding. Meghan DEL CID, and Alex braga other were present with pt's permission.   Subjective  Subjective Comments: Alert, pleasant, and cooperative.   Thin  Presentation: Cup;Straw  Quantity: 6 oz  Oral Phase: Increased anterior to posterior transit time;Premature spillage suspected  Pharyngeal Phase: Delayed swallow;Decreased hyolaryngeal elevation;Throat clearing - delayed  Comments: Single mild dry throat clear (unsure if related); otherwise no SS aspiration with water.   General Consistency  Quantity: fresh fruit cup  Oral Phase: Slow/weak mastication;Increased anterior to posterior transit time;Premature spillage suspected  Pharyngeal Phase: Delayed swallow;Decreased hyolaryngeal elevation  Comments: No SS aspiration with fruit.   Pill  Quantity: x4 whole pills one at a time; presented by Meghan DEL CID  Oral Phase: Increased anterior to posterior transit time;Premature spillage suspected  Pharyngeal Phase: Delayed swallow;Decreased hyolaryngeal elevation  Comments: No SS aspiration with whole pills consumed with thin liquids.   Esophageal Phase  Esophageal Phase: No esophageal symptoms noted  Aspiration Risk  Risk for Aspiration: Minimal    GOALS:  SLP Goals  Short Term Goals to be Reviewed on : 01/01/20  STG are the Same as Discharge Goals: No  Goal Agreement: Patient agrees with goals and treatment plan, Family/significant other/caregiver agrees  Swallowing Short Term Goal 1  Swallowing STG 1: Tolerate thin liquids with < 5% SS aspiration, WFL oral phase, and stable or improving temps and lung status.   Swallowing Discharge Goal 1: Tolerate least restrictive diet.   Swallowing  Short Term Goal 2  Swallowing STG 2: Tolerate general solids with < 5% SS aspiration, WFL oral phase, and stable or improving temps and lung status.   Swallowing Discharge Goal 2: Tolerate least restrictive diet.        (4) no impairment

## 2020-01-07 ENCOUNTER — RX RENEWAL (OUTPATIENT)
Age: 55
End: 2020-01-07

## 2020-01-12 ENCOUNTER — OUTPATIENT (OUTPATIENT)
Dept: OUTPATIENT SERVICES | Facility: HOSPITAL | Age: 55
LOS: 1 days | Discharge: HOME | End: 2020-01-12

## 2020-01-12 DIAGNOSIS — C49.9 MALIGNANT NEOPLASM OF CONNECTIVE AND SOFT TISSUE, UNSPECIFIED: ICD-10-CM

## 2020-01-12 DIAGNOSIS — Z98.89 OTHER SPECIFIED POSTPROCEDURAL STATES: Chronic | ICD-10-CM

## 2020-01-12 DIAGNOSIS — Z90.721 ACQUIRED ABSENCE OF OVARIES, UNILATERAL: Chronic | ICD-10-CM

## 2020-01-12 DIAGNOSIS — C49.9 MALIGNANT NEOPLASM OF CONNECTIVE AND SOFT TISSUE, UNSPECIFIED: Chronic | ICD-10-CM

## 2020-01-13 ENCOUNTER — APPOINTMENT (OUTPATIENT)
Dept: HEMATOLOGY ONCOLOGY | Facility: CLINIC | Age: 55
End: 2020-01-13
Payer: COMMERCIAL

## 2020-01-13 VITALS
OXYGEN SATURATION: 99 % | BODY MASS INDEX: 28.33 KG/M2 | SYSTOLIC BLOOD PRESSURE: 113 MMHG | HEART RATE: 80 BPM | WEIGHT: 149.91 LBS | TEMPERATURE: 98.3 F | RESPIRATION RATE: 16 BRPM | DIASTOLIC BLOOD PRESSURE: 75 MMHG
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
## 2020-01-13 DIAGNOSIS — G54.1 LUMBOSACRAL PLEXUS DISORDERS: ICD-10-CM

## 2020-01-13 DIAGNOSIS — G57.21 LESION OF FEMORAL NERVE, RIGHT LOWER LIMB: ICD-10-CM

## 2020-01-13 PROCEDURE — 99215 OFFICE O/P EST HI 40 MIN: CPT

## 2020-01-14 NOTE — PHYSICAL EXAM
[Ambulatory and capable of all self care but unable to carry out any work activities] : Status 2- Ambulatory and capable of all self care but unable to carry out any work activities. Up and about more than 50% of waking hours [Normal] : grossly intact [de-identified] : r groin indurated and hyperpigmented/RT fibrosis.

## 2020-01-14 NOTE — ASSESSMENT
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 Will rotate to methadone 9 less constipating , better suited for neuropathic pain )at 10 mg per night RTC 3 mos.Aggressive laxative protocol emphasized.Will call in 2 weeks to report efficacy of methadone rotation.d/c oxycontin and dec dose of oxycodone. [Palliative Care Plan] : not applicable at this time [Supportive] : Goals of care discussed with patient: Supportive

## 2020-01-14 NOTE — HISTORY OF PRESENT ILLNESS
[de-identified] : Additional d/w pt about ins stopping coverage of  Oxycontin ( [facility] ), in setting of a routine and structured visit for CRPS r groin.Doing "OK" pain controlled w low dose Oxycodone for extensive CRPS of r groin, which is permanently indurated and sore'"swollen feeling " Recent MRI is LUC [de-identified] : 50-year-old woman with a perplexing pain syndrome. 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 Nevertheless she still has pain in this area. She states that the obesity really helps her in for a brief period for 2-3 hours as ibuprofen. Oxycodone has a minimal effect.

## 2020-03-27 ENCOUNTER — OUTPATIENT (OUTPATIENT)
Dept: OUTPATIENT SERVICES | Facility: HOSPITAL | Age: 55
LOS: 1 days | End: 2020-03-27
Payer: COMMERCIAL

## 2020-03-27 ENCOUNTER — RESULT REVIEW (OUTPATIENT)
Age: 55
End: 2020-03-27

## 2020-03-27 ENCOUNTER — APPOINTMENT (OUTPATIENT)
Dept: MRI IMAGING | Facility: CLINIC | Age: 55
End: 2020-03-27
Payer: COMMERCIAL

## 2020-03-27 DIAGNOSIS — Z90.721 ACQUIRED ABSENCE OF OVARIES, UNILATERAL: Chronic | ICD-10-CM

## 2020-03-27 DIAGNOSIS — C49.9 MALIGNANT NEOPLASM OF CONNECTIVE AND SOFT TISSUE, UNSPECIFIED: Chronic | ICD-10-CM

## 2020-03-27 DIAGNOSIS — Z98.89 OTHER SPECIFIED POSTPROCEDURAL STATES: Chronic | ICD-10-CM

## 2020-03-27 DIAGNOSIS — C49.9 MALIGNANT NEOPLASM OF CONNECTIVE AND SOFT TISSUE, UNSPECIFIED: ICD-10-CM

## 2020-03-27 PROCEDURE — 73723 MRI JOINT LWR EXTR W/O&W/DYE: CPT

## 2020-03-27 PROCEDURE — 73723 MRI JOINT LWR EXTR W/O&W/DYE: CPT | Mod: 26,RT

## 2020-03-27 PROCEDURE — A9585: CPT

## 2020-04-14 ENCOUNTER — OUTPATIENT (OUTPATIENT)
Dept: OUTPATIENT SERVICES | Facility: HOSPITAL | Age: 55
LOS: 1 days | Discharge: HOME | End: 2020-04-14

## 2020-04-14 DIAGNOSIS — C49.9 MALIGNANT NEOPLASM OF CONNECTIVE AND SOFT TISSUE, UNSPECIFIED: ICD-10-CM

## 2020-04-14 DIAGNOSIS — Z98.89 OTHER SPECIFIED POSTPROCEDURAL STATES: Chronic | ICD-10-CM

## 2020-04-14 DIAGNOSIS — C49.9 MALIGNANT NEOPLASM OF CONNECTIVE AND SOFT TISSUE, UNSPECIFIED: Chronic | ICD-10-CM

## 2020-04-14 DIAGNOSIS — Z90.721 ACQUIRED ABSENCE OF OVARIES, UNILATERAL: Chronic | ICD-10-CM

## 2020-04-16 ENCOUNTER — APPOINTMENT (OUTPATIENT)
Dept: HEMATOLOGY ONCOLOGY | Facility: CLINIC | Age: 55
End: 2020-04-16
Payer: COMMERCIAL

## 2020-04-16 PROCEDURE — 99442: CPT

## 2020-08-14 NOTE — DISCHARGE NOTE ADULT - USE THE 5 A'S (ASK, ADVISE, ASSESS, ASSIST, ARRANGE)
Pt up and walking w , pt crying outloud, huntch over moaning, rn attempted to give pt wheelchair pt refused, pt upset she is in pain and not in a room pt asking why she cannot go upstairs prior to walking w , rn called post partum sylivia charge nurse on what medication to administer , per naomi w assessment given to her by mathew, agrees it may be gas pain, rn called pharmacy for mylicon and will give dilaudid per mar.      Carlos Alberto Amaya RN  08/14/20 1130 [address], JAIME  08/14/20 5429 Statement Selected

## 2020-08-25 ENCOUNTER — RESULT REVIEW (OUTPATIENT)
Age: 55
End: 2020-08-25

## 2020-10-14 ENCOUNTER — OUTPATIENT (OUTPATIENT)
Dept: OUTPATIENT SERVICES | Facility: HOSPITAL | Age: 55
LOS: 1 days | Discharge: HOME | End: 2020-10-14

## 2020-10-14 DIAGNOSIS — Z98.89 OTHER SPECIFIED POSTPROCEDURAL STATES: Chronic | ICD-10-CM

## 2020-10-14 DIAGNOSIS — Z90.721 ACQUIRED ABSENCE OF OVARIES, UNILATERAL: Chronic | ICD-10-CM

## 2020-10-14 DIAGNOSIS — C49.9 MALIGNANT NEOPLASM OF CONNECTIVE AND SOFT TISSUE, UNSPECIFIED: ICD-10-CM

## 2020-10-14 DIAGNOSIS — C49.9 MALIGNANT NEOPLASM OF CONNECTIVE AND SOFT TISSUE, UNSPECIFIED: Chronic | ICD-10-CM

## 2020-10-22 ENCOUNTER — APPOINTMENT (OUTPATIENT)
Dept: HEMATOLOGY ONCOLOGY | Facility: CLINIC | Age: 55
End: 2020-10-22
Payer: COMMERCIAL

## 2020-10-22 PROCEDURE — 99443: CPT

## 2020-10-27 ENCOUNTER — APPOINTMENT (OUTPATIENT)
Dept: CT IMAGING | Facility: CLINIC | Age: 55
End: 2020-10-27
Payer: COMMERCIAL

## 2020-10-27 ENCOUNTER — OUTPATIENT (OUTPATIENT)
Dept: OUTPATIENT SERVICES | Facility: HOSPITAL | Age: 55
LOS: 1 days | End: 2020-10-27
Payer: COMMERCIAL

## 2020-10-27 ENCOUNTER — APPOINTMENT (OUTPATIENT)
Dept: MRI IMAGING | Facility: CLINIC | Age: 55
End: 2020-10-27
Payer: COMMERCIAL

## 2020-10-27 ENCOUNTER — RESULT REVIEW (OUTPATIENT)
Age: 55
End: 2020-10-27

## 2020-10-27 DIAGNOSIS — C49.9 MALIGNANT NEOPLASM OF CONNECTIVE AND SOFT TISSUE, UNSPECIFIED: Chronic | ICD-10-CM

## 2020-10-27 DIAGNOSIS — Z98.89 OTHER SPECIFIED POSTPROCEDURAL STATES: Chronic | ICD-10-CM

## 2020-10-27 DIAGNOSIS — Z00.8 ENCOUNTER FOR OTHER GENERAL EXAMINATION: ICD-10-CM

## 2020-10-27 DIAGNOSIS — Z90.721 ACQUIRED ABSENCE OF OVARIES, UNILATERAL: Chronic | ICD-10-CM

## 2020-10-27 PROCEDURE — 71250 CT THORAX DX C-: CPT

## 2020-10-27 PROCEDURE — 73723 MRI JOINT LWR EXTR W/O&W/DYE: CPT

## 2020-10-27 PROCEDURE — 71250 CT THORAX DX C-: CPT | Mod: 26

## 2020-10-27 PROCEDURE — 73723 MRI JOINT LWR EXTR W/O&W/DYE: CPT | Mod: 26,RT

## 2020-10-27 PROCEDURE — A9585: CPT

## 2021-01-06 ENCOUNTER — NON-APPOINTMENT (OUTPATIENT)
Age: 56
End: 2021-01-06

## 2021-04-15 ENCOUNTER — APPOINTMENT (OUTPATIENT)
Dept: PAIN MANAGEMENT | Facility: CLINIC | Age: 56
End: 2021-04-15
Payer: COMMERCIAL

## 2021-04-15 VITALS
WEIGHT: 149 LBS | HEIGHT: 61 IN | SYSTOLIC BLOOD PRESSURE: 114 MMHG | DIASTOLIC BLOOD PRESSURE: 72 MMHG | BODY MASS INDEX: 28.13 KG/M2 | HEART RATE: 95 BPM

## 2021-04-15 VITALS — TEMPERATURE: 97.5 F

## 2021-04-15 PROCEDURE — 99072 ADDL SUPL MATRL&STAF TM PHE: CPT

## 2021-04-15 PROCEDURE — 99204 OFFICE O/P NEW MOD 45 MIN: CPT

## 2021-04-15 NOTE — PHYSICAL EXAM
[General Appearance - Alert] : alert [Affect] : the affect was normal [Person] : oriented to person [Place] : oriented to place [Time] : oriented to time [Cranial Nerves Oculomotor (III)] : extraocular motion intact [Motor Tone] : muscle tone was normal in all four extremities [Motor Strength] : muscle strength was normal in all four extremities [Involuntary Movements] : no involuntary movements were seen [No Muscle Atrophy] : normal bulk in all four extremities [Abnormal Walk] : normal gait [Balance] : balance was intact [Sclera] : the sclera and conjunctiva were normal [Outer Ear] : the ears and nose were normal in appearance [Neck Appearance] : the appearance of the neck was normal [] : no rash [FreeTextEntry7] : diminished sensation to touch involving the entire anterolateral thigh not extending to knee.  [FreeTextEntry1] : right patricks positive; Neg SLRT

## 2021-04-15 NOTE — HISTORY OF PRESENT ILLNESS
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 Onset of initial sxs began 2011.\par \par Following surgery and radiation therapy (2014), the pain began involving the right hip/hip. The pain is described as an intermittent, achy throbbing non burning sensation improved by lying supine. She states the pain occurs about 40 % of time worsened by walking [place] 4-5/10 "bearable'.\par \par Current medication: Oxycodone 5 mg 6 qd (2014); Methadone 10 mgs qhs (for one year). On average day 5 per day. Mobic 15 mgs qd. Tried Oxycontin in past but insurance no longer paid it/\par Works FT virtual at moment\par No recreational drug use\par Ho depression 75 qd. Effexor Seroquel 25 mgs \par ISTOP 2

## 2021-04-15 NOTE — CONSULT LETTER
[Dear  ___] : Dear ~WILI, [Consult Letter:] : I had the pleasure of evaluating your patient, [unfilled]. [( Thank you for referring [unfilled] for consultation for _____ )] : Thank you for referring [unfilled] for consultation for [unfilled]

## 2021-04-15 NOTE — ASSESSMENT
[FreeTextEntry1] : Chronic right hip/thigh pain\par No signs of toxicity observed and will continue to monitor.\par Will decrease oxycodone 5 mgs 1 qid. Continue methadone at but at 5 mg qhs\par Will increase venlafaxine 37.5 mgs to 75 mgs \par Provided patient info regarding use of chronic opioids and risks\par ISTOP reviewed\par UDT\par \par \par

## 2021-05-06 ENCOUNTER — APPOINTMENT (OUTPATIENT)
Dept: PAIN MANAGEMENT | Facility: CLINIC | Age: 56
End: 2021-05-06
Payer: COMMERCIAL

## 2021-05-06 VITALS
DIASTOLIC BLOOD PRESSURE: 69 MMHG | HEART RATE: 74 BPM | HEIGHT: 61 IN | BODY MASS INDEX: 28.13 KG/M2 | WEIGHT: 149 LBS | SYSTOLIC BLOOD PRESSURE: 101 MMHG

## 2021-05-06 VITALS — TEMPERATURE: 97.6 F

## 2021-05-06 PROCEDURE — 99212 OFFICE O/P EST SF 10 MIN: CPT

## 2021-05-06 PROCEDURE — 99072 ADDL SUPL MATRL&STAF TM PHE: CPT

## 2021-05-06 NOTE — REVIEW OF SYSTEMS
[Fever] : no fever [Chills] : no chills [Chest Pain] : no chest pain [Palpitations] : no palpitations [Shortness Of Breath] : no shortness of breath [Limb Pain] : limb pain [Anxiety] : no anxiety [Depression] : no depression

## 2021-05-06 NOTE — ASSESSMENT
[FreeTextEntry1] : No changes today . \par Pt to f/u in 1 month - TEB is ok \par I will continue to monitor for signs of toxicity.

## 2021-05-06 NOTE — PHYSICAL EXAM
[General Appearance - Alert] : alert [General Appearance - Well-Appearing] : healthy appearing [] : normal voice and communication [Oriented To Time, Place, And Person] : oriented to person, place, and time [Affect] : the affect was normal [Mood] : the mood was normal [Motor Strength] : muscle strength was normal in all four extremities [Sclera] : the sclera and conjunctiva were normal

## 2021-05-06 NOTE — HISTORY OF PRESENT ILLNESS
[FreeTextEntry1] : ISTOP # [id number]\par Pt returns today for a followup visit . \par Pt has decreased the Oxycodone from 6 tab / day to 4 tab / day . It has been difficult but patient is adjusting . \par Did not receive increase dose of Effexor yet. \par Pain is right hip  s/p sarcoma - had 2 surgeries in 2011 and 2014. . \par No signs of toxicity noted .\par Mood has been good. \par Pt reminded to avoid alcohol . \par Works for a law firm \par Has teenaged daughters. \par

## 2021-06-07 ENCOUNTER — APPOINTMENT (OUTPATIENT)
Dept: PAIN MANAGEMENT | Facility: CLINIC | Age: 56
End: 2021-06-07
Payer: COMMERCIAL

## 2021-06-07 PROCEDURE — 99212 OFFICE O/P EST SF 10 MIN: CPT | Mod: 95

## 2021-06-07 NOTE — PHYSICAL EXAM
[General Appearance - Alert] : alert [General Appearance - In No Acute Distress] : in no acute distress [General Appearance - Well-Appearing] : healthy appearing [Oriented To Time, Place, And Person] : oriented to person, place, and time [Affect] : the affect was normal [Mood] : the mood was normal [Sclera] : the sclera and conjunctiva were normal [] : no respiratory distress

## 2021-06-07 NOTE — ASSESSMENT
[FreeTextEntry1] : no changes today. \par RTO 1 month - TEB is ok. \par I will continue to monitor ISTOP .  [Opioids] : Patient was explained in detail about pain control by using opioids. Patient has signed and fully understands our guidelines for medication and drug screening.  Patient understands the side effects of opioids, including, but not limited to, drug tolerance, dependence, potential for addiction. This class of drugs is habit-forming and DARRELL regulated. The sedative effects of opioids can be potentiated by taking alcohol or any sleeping pills, along with opioids. The decision to drive is patient’s responsibility, as opioids can affect his/her driving ability and ability to concentrate. The long-term place is not clear, however, patient understands that once the pain control optimizes, the goal will be to wean off the opioids. All the issues regarding opioid treatment have been addressed satisfactorily.

## 2021-06-07 NOTE — HISTORY OF PRESENT ILLNESS
[FreeTextEntry1] : istop#[id number]\par Patient returns today via TEB . \par \par Continues to have pain to thigh and pelvis - right sided .s/p removal of sarcoma - right hip \par \par Denies any adverse effects from current pain meds. \par Pt is working from home but remains active. \par Mood has been stable \par Denies any new issues. \par

## 2021-07-01 ENCOUNTER — APPOINTMENT (OUTPATIENT)
Dept: PAIN MANAGEMENT | Facility: CLINIC | Age: 56
End: 2021-07-01
Payer: COMMERCIAL

## 2021-07-01 PROCEDURE — 99212 OFFICE O/P EST SF 10 MIN: CPT | Mod: 95

## 2021-07-01 NOTE — REASON FOR VISIT
[Home] : at home, [unfilled] , at the time of the visit. [Medical Office: ([facility])___] : at the medical office located in  [Verbal consent obtained from patient] : the patient, [unfilled] [Follow-Up: _____] : a [unfilled] follow-up visit

## 2021-07-01 NOTE — ASSESSMENT
[FreeTextEntry1] : No changes today \par I will continue to monitor ISTOP ,\par RTO 1 month \par TEB is ok.  [Opioids] : Patient was explained in detail about pain control by using opioids. Patient has signed and fully understands our guidelines for medication and drug screening.  Patient understands the side effects of opioids, including, but not limited to, drug tolerance, dependence, potential for addiction. This class of drugs is habit-forming and DARRELL regulated. The sedative effects of opioids can be potentiated by taking alcohol or any sleeping pills, along with opioids. The decision to drive is patient’s responsibility, as opioids can affect his/her driving ability and ability to concentrate. The long-term place is not clear, however, patient understands that once the pain control optimizes, the goal will be to wean off the opioids. All the issues regarding opioid treatment have been addressed satisfactorily.

## 2021-07-01 NOTE — PHYSICAL EXAM
[FreeTextEntry1] : ISTOP # [id number]\par Right pelvic pain and right thigh pain after resection of a sarcoma .\par Pt remains active , working from home . \par Denies any new health issues. \par Endorses meds are secure , denies alcohol use. \par  [General Appearance - Alert] : alert [General Appearance - Well-Appearing] : healthy appearing [General Appearance - In No Acute Distress] : in no acute distress [Oriented To Time, Place, And Person] : oriented to person, place, and time [Affect] : the affect was normal [Mood] : the mood was normal [Sclera] : the sclera and conjunctiva were normal [] : no respiratory distress

## 2021-07-01 NOTE — HISTORY OF PRESENT ILLNESS
[FreeTextEntry1] : istop#[id number]\par Patient returns today via TEB . \par \par Continues to have pain to thigh and pelvis - right sided .s/p removal of sarcoma - right hip \par \par Denies any adverse effects from current pain meds. \par Pt is working from home but remains active. \par Mood has been stable \par Denies any new issues. \par

## 2021-08-05 ENCOUNTER — RX RENEWAL (OUTPATIENT)
Age: 56
End: 2021-08-05

## 2021-08-10 ENCOUNTER — APPOINTMENT (OUTPATIENT)
Dept: PAIN MANAGEMENT | Facility: CLINIC | Age: 56
End: 2021-08-10
Payer: COMMERCIAL

## 2021-08-10 ENCOUNTER — NON-APPOINTMENT (OUTPATIENT)
Age: 56
End: 2021-08-10

## 2021-08-10 PROCEDURE — 99212 OFFICE O/P EST SF 10 MIN: CPT | Mod: 95

## 2021-08-10 NOTE — REASON FOR VISIT
[Home] : at home, [unfilled] , at the time of the visit. [Medical Office: ([facility])___] : at the medical office located in  [Verbal consent obtained from patient] : the patient, [unfilled] [Follow-Up: _____] : a [unfilled] follow-up visit

## 2021-08-10 NOTE — ASSESSMENT
[FreeTextEntry1] : No changes today .\par RTO 1month\par I will continue to monitor ISTOP .  [Opioids] : Patient was explained in detail about pain control by using opioids. Patient has signed and fully understands our guidelines for medication and drug screening.  Patient understands the side effects of opioids, including, but not limited to, drug tolerance, dependence, potential for addiction. This class of drugs is habit-forming and DARRELL regulated. The sedative effects of opioids can be potentiated by taking alcohol or any sleeping pills, along with opioids. The decision to drive is patient’s responsibility, as opioids can affect his/her driving ability and ability to concentrate. The long-term place is not clear, however, patient understands that once the pain control optimizes, the goal will be to wean off the opioids. All the issues regarding opioid treatment have been addressed satisfactorily.

## 2021-08-10 NOTE — HISTORY OF PRESENT ILLNESS
[FreeTextEntry1] : ISTOP # [id number]\par Pt returns via TEB , continues to have pelvic pain and right thigh pain after removal of a sarcoma.\par Pt remains active - walking her dog daily , yard work and working from home . \par No signs of toxicity noted .\par Pt denies alcohol use .\par Mood has been stable. \par

## 2021-09-08 ENCOUNTER — APPOINTMENT (OUTPATIENT)
Dept: PAIN MANAGEMENT | Facility: CLINIC | Age: 56
End: 2021-09-08
Payer: COMMERCIAL

## 2021-09-08 VITALS
SYSTOLIC BLOOD PRESSURE: 116 MMHG | WEIGHT: 140 LBS | BODY MASS INDEX: 26.43 KG/M2 | HEART RATE: 81 BPM | DIASTOLIC BLOOD PRESSURE: 68 MMHG | HEIGHT: 61 IN

## 2021-09-08 PROCEDURE — 99212 OFFICE O/P EST SF 10 MIN: CPT

## 2021-09-17 NOTE — ASSESSMENT
[FreeTextEntry1] : Tenderness noted over to right sided middle back - trial of lidocaine  for 1 weeks , if not improved will send for MRI t- spine.\par No changes in meds.\par RTO 1 month  [Opioids] : Patient was explained in detail about pain control by using opioids. Patient has signed and fully understands our guidelines for medication and drug screening.  Patient understands the side effects of opioids, including, but not limited to, drug tolerance, dependence, potential for addiction. This class of drugs is habit-forming and DARRELL regulated. The sedative effects of opioids can be potentiated by taking alcohol or any sleeping pills, along with opioids. The decision to drive is patient’s responsibility, as opioids can affect his/her driving ability and ability to concentrate. The long-term place is not clear, however, patient understands that once the pain control optimizes, the goal will be to wean off the opioids. All the issues regarding opioid treatment have been addressed satisfactorily.

## 2021-09-17 NOTE — PHYSICAL EXAM
[General Appearance - Alert] : alert [General Appearance - Well-Appearing] : healthy appearing [] : normal voice and communication [Oriented To Time, Place, And Person] : oriented to person, place, and time [Affect] : the affect was normal [Mood] : the mood was normal [Sclera] : the sclera and conjunctiva were normal [Cranial Nerves Facial (VII)] : face symmetrical [Cranial Nerves Accessory (XI - Cranial And Spinal)] : head turning and shoulder shrug symmetric [Cranial Nerves Hypoglossal (XII)] : there was no tongue deviation with protrusion [Abnormal Walk] : normal gait

## 2021-09-17 NOTE — HISTORY OF PRESENT ILLNESS
[FreeTextEntry1] : ISTOP # [id number] , continues to have pelvic pain and right thigh pain after removal of a sarcoma.\par Pt remains active - walking her dog daily , yard work and working from home . \par No signs of toxicity noted .\par Pt denies alcohol use .\par Mood has been stable. \par  \par Now has pain to right thoracic spine area.

## 2021-10-14 NOTE — ASU PREOP CHECKLIST - SKIN PREP
I have reviewed the history, physical exam, assessment and management plans.  I concur with or have edited all elements of her note. done

## 2021-10-22 ENCOUNTER — RX RENEWAL (OUTPATIENT)
Age: 56
End: 2021-10-22

## 2021-11-05 ENCOUNTER — APPOINTMENT (OUTPATIENT)
Dept: PAIN MANAGEMENT | Facility: CLINIC | Age: 56
End: 2021-11-05
Payer: COMMERCIAL

## 2021-11-05 PROCEDURE — 99212 OFFICE O/P EST SF 10 MIN: CPT | Mod: 95

## 2021-11-05 NOTE — ASSESSMENT
[FreeTextEntry1] : No change in pain meds \par MRI scheduled for 11/15/21 \par RTO 1 month  [Opioids] : Patient was explained in detail about pain control by using opioids. Patient has signed and fully understands our guidelines for medication and drug screening.  Patient understands the side effects of opioids, including, but not limited to, drug tolerance, dependence, potential for addiction. This class of drugs is habit-forming and DARRELL regulated. The sedative effects of opioids can be potentiated by taking alcohol or any sleeping pills, along with opioids. The decision to drive is patient’s responsibility, as opioids can affect his/her driving ability and ability to concentrate. The long-term place is not clear, however, patient understands that once the pain control optimizes, the goal will be to wean off the opioids. All the issues regarding opioid treatment have been addressed satisfactorily.

## 2021-11-05 NOTE — REASON FOR VISIT
[Home] : at home, [unfilled] , at the time of the visit. [Medical Office: ([facility])___] : at the medical office located in  [Verbal consent obtained from patient] : the patient, [unfilled] [Follow-Up: _____] : a [unfilled] follow-up visit

## 2021-11-05 NOTE — HISTORY OF PRESENT ILLNESS
[FreeTextEntry1] : istop#[id number]\par Pt continues to have mid  back pain ,it has improved slightly . MRI scheduled for 11/15/21. \par Continues to have pelvic pain from I.C . Has not had any UTIs since last appt. \par \par No signs of toxicity noted . \par Mood has been stable . \par Pt does not drink alcohol .\par Continued to remain active walking dog and working  from home.

## 2021-11-15 ENCOUNTER — APPOINTMENT (OUTPATIENT)
Dept: MRI IMAGING | Facility: CLINIC | Age: 56
End: 2021-11-15
Payer: COMMERCIAL

## 2021-11-15 ENCOUNTER — APPOINTMENT (OUTPATIENT)
Dept: CT IMAGING | Facility: CLINIC | Age: 56
End: 2021-11-15
Payer: COMMERCIAL

## 2021-11-15 ENCOUNTER — OUTPATIENT (OUTPATIENT)
Dept: OUTPATIENT SERVICES | Facility: HOSPITAL | Age: 56
LOS: 1 days | End: 2021-11-15
Payer: COMMERCIAL

## 2021-11-15 DIAGNOSIS — Z98.89 OTHER SPECIFIED POSTPROCEDURAL STATES: Chronic | ICD-10-CM

## 2021-11-15 DIAGNOSIS — C49.9 MALIGNANT NEOPLASM OF CONNECTIVE AND SOFT TISSUE, UNSPECIFIED: ICD-10-CM

## 2021-11-15 DIAGNOSIS — C49.9 MALIGNANT NEOPLASM OF CONNECTIVE AND SOFT TISSUE, UNSPECIFIED: Chronic | ICD-10-CM

## 2021-11-15 DIAGNOSIS — Z90.721 ACQUIRED ABSENCE OF OVARIES, UNILATERAL: Chronic | ICD-10-CM

## 2021-11-15 PROCEDURE — 73723 MRI JOINT LWR EXTR W/O&W/DYE: CPT | Mod: 26,RT

## 2021-11-15 PROCEDURE — 72157 MRI CHEST SPINE W/O & W/DYE: CPT | Mod: 26

## 2021-11-15 PROCEDURE — 73723 MRI JOINT LWR EXTR W/O&W/DYE: CPT

## 2021-11-15 PROCEDURE — 71250 CT THORAX DX C-: CPT | Mod: 26

## 2021-11-15 PROCEDURE — 72157 MRI CHEST SPINE W/O & W/DYE: CPT

## 2021-11-15 PROCEDURE — 71250 CT THORAX DX C-: CPT

## 2021-11-15 PROCEDURE — A9585: CPT

## 2021-12-03 DIAGNOSIS — R91.1 SOLITARY PULMONARY NODULE: ICD-10-CM

## 2021-12-07 ENCOUNTER — APPOINTMENT (OUTPATIENT)
Dept: PAIN MANAGEMENT | Facility: CLINIC | Age: 56
End: 2021-12-07
Payer: COMMERCIAL

## 2021-12-07 VITALS
HEIGHT: 61 IN | SYSTOLIC BLOOD PRESSURE: 104 MMHG | DIASTOLIC BLOOD PRESSURE: 68 MMHG | HEART RATE: 87 BPM | WEIGHT: 128 LBS | BODY MASS INDEX: 24.17 KG/M2

## 2021-12-07 PROCEDURE — 99212 OFFICE O/P EST SF 10 MIN: CPT

## 2021-12-07 NOTE — PHYSICAL EXAM
[General Appearance - Alert] : alert [General Appearance - Well-Appearing] : healthy appearing [Oriented To Time, Place, And Person] : oriented to person, place, and time [Affect] : the affect was normal [Mood] : the mood was normal [Motor Strength] : muscle strength was normal in all four extremities [Motor Strength Lower Extremities Bilaterally] : strength was normal in both lower extremities [] : no respiratory distress

## 2021-12-07 NOTE — ASSESSMENT
[FreeTextEntry1] : No changes today . \par RTO 1 month - TEB is ok . \par  [Opioids] : Patient was explained in detail about pain control by using opioids. Patient has signed and fully understands our guidelines for medication and drug screening.  Patient understands the side effects of opioids, including, but not limited to, drug tolerance, dependence, potential for addiction. This class of drugs is habit-forming and DARRELL regulated. The sedative effects of opioids can be potentiated by taking alcohol or any sleeping pills, along with opioids. The decision to drive is patient’s responsibility, as opioids can affect his/her driving ability and ability to concentrate. The long-term place is not clear, however, patient understands that once the pain control optimizes, the goal will be to wean off the opioids. All the issues regarding opioid treatment have been addressed satisfactorily.

## 2021-12-07 NOTE — HISTORY OF PRESENT ILLNESS
[FreeTextEntry1] : istop#[id number]\par Pt continues to have right hip pain ( site of sarcoma). Thoracic spine pain has improved. I reviewed MRI thoracic spine from 11/15/21 with patient. \par No signs of toxicity noted .\par Pt denies alcohol use. 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

## 2022-01-04 ENCOUNTER — APPOINTMENT (OUTPATIENT)
Dept: PAIN MANAGEMENT | Facility: CLINIC | Age: 57
End: 2022-01-04
Payer: COMMERCIAL

## 2022-01-04 PROCEDURE — 99212 OFFICE O/P EST SF 10 MIN: CPT | Mod: 95

## 2022-01-04 NOTE — HISTORY OF PRESENT ILLNESS
[FreeTextEntry1] : ISTOP#[id number]\par Pt continues to have right hip pain. \par mid back pain is much improved . Pt denies and new symptoms. Mood has been good. Denies alcohol use.\par No signs of toxicity. \par Continues to work from home, scheduled to go back to work at the end of January.

## 2022-01-04 NOTE — PHYSICAL EXAM
[General Appearance - Alert] : alert [General Appearance - Well-Appearing] : healthy appearing [Oriented To Time, Place, And Person] : oriented to person, place, and time [Affect] : the affect was normal [Mood] : the mood was normal [] : no respiratory distress

## 2022-01-04 NOTE — ASSESSMENT
[FreeTextEntry1] : No changes today. \par We will continue to monitor for toxicity and ISTOP,\par consider decrease in dose in future. \par RTO 1 month\par

## 2022-02-15 ENCOUNTER — APPOINTMENT (OUTPATIENT)
Dept: PAIN MANAGEMENT | Facility: CLINIC | Age: 57
End: 2022-02-15
Payer: COMMERCIAL

## 2022-02-15 PROCEDURE — 99212 OFFICE O/P EST SF 10 MIN: CPT | Mod: 95

## 2022-02-16 NOTE — PHYSICAL EXAM
[Oriented To Time, Place, And Person] : oriented to person, place, and time [Affect] : the affect was normal [Mood] : the mood was normal [Sclera] : the sclera and conjunctiva were normal

## 2022-02-16 NOTE — ASSESSMENT
[FreeTextEntry1] : At this refill we will decrease by 5 tablets of pain medication \par Pt to RTO in office 1 month \par Continue ISTOP monitoring \par  [Opioids] : Patient was explained in detail about pain control by using opioids. Patient has signed and fully understands our guidelines for medication and drug screening.  Patient understands the side effects of opioids, including, but not limited to, drug tolerance, dependence, potential for addiction. This class of drugs is habit-forming and DARRELL regulated. The sedative effects of opioids can be potentiated by taking alcohol or any sleeping pills, along with opioids. The decision to drive is patient’s responsibility, as opioids can affect his/her driving ability and ability to concentrate. The long-term place is not clear, however, patient understands that once the pain control optimizes, the goal will be to wean off the opioids. All the issues regarding opioid treatment have been addressed satisfactorily.

## 2022-02-16 NOTE — HISTORY OF PRESENT ILLNESS
[FreeTextEntry1] : ISTOP reviewed and appropriate. \par Pt continues to have right hip pain.\par  Denies any new symptoms\par  No signs of toxicity. \par \par Discussed with patient trial of decreasing opioid dose . Plan would be to decrease amount by 5 tablets this month. Pt is agreeable to this .\par

## 2022-02-16 NOTE — REVIEW OF SYSTEMS
[Fever] : no fever [Chills] : no chills [Chest Pain] : no chest pain [Palpitations] : no palpitations [Joint Pain] : joint pain [Dizziness] : no dizziness [Fainting] : no fainting

## 2022-02-16 NOTE — REASON FOR VISIT
[Home] : at home, [unfilled] , at the time of the visit. [Medical Office: ([facility])___] : at the medical office located in  [Verbal consent obtained from patient] : the patient, [unfilled] [Follow-Up: _____] : a [unfilled] follow-up visit

## 2022-03-23 ENCOUNTER — NON-APPOINTMENT (OUTPATIENT)
Age: 57
End: 2022-03-23

## 2022-03-23 ENCOUNTER — APPOINTMENT (OUTPATIENT)
Dept: NEUROLOGY | Facility: CLINIC | Age: 57
End: 2022-03-23
Payer: COMMERCIAL

## 2022-03-23 PROCEDURE — 99212 OFFICE O/P EST SF 10 MIN: CPT | Mod: 95

## 2022-03-23 NOTE — HISTORY OF PRESENT ILLNESS
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 She continues to reports constant pain in right thigh and groin described as a dull ache 3/10 on average with current meds but up to 5/10.  \par \par Current meds include: Oxycodone 5 mg 4x/day qty 115; Methadone 5 mgs qhs, Effexor 37.5mg, Seroquel 25 mg\par

## 2022-03-23 NOTE — PHYSICAL EXAM
[General Appearance - Alert] : alert [Oriented To Time, Place, And Person] : oriented to person, place, and time [Affect] : the affect was normal [Mood] : the mood was normal [FreeTextEntry1] : no toxicity

## 2022-03-23 NOTE — ASSESSMENT
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 \par I-Stop reviewed,  reference #: [id number]\par No signs of aberrant behavior and will continue to monitor for signs of toxicity.\par Reminded to continue to avoid alcohol.\par \par I am seeing GUILHERME MACKENZIE as incident to service. Dr. Mcintyre is present in the office suite immediately available and able to provide assistance and direction throughout the time the service was performed.\par \par

## 2022-04-04 ENCOUNTER — APPOINTMENT (OUTPATIENT)
Dept: PAIN MANAGEMENT | Facility: CLINIC | Age: 57
End: 2022-04-04
Payer: COMMERCIAL

## 2022-04-04 VITALS
DIASTOLIC BLOOD PRESSURE: 73 MMHG | HEIGHT: 61 IN | BODY MASS INDEX: 24.73 KG/M2 | SYSTOLIC BLOOD PRESSURE: 121 MMHG | HEART RATE: 89 BPM | WEIGHT: 131 LBS

## 2022-04-04 PROCEDURE — 99213 OFFICE O/P EST LOW 20 MIN: CPT

## 2022-04-10 NOTE — HISTORY OF PRESENT ILLNESS
[FreeTextEntry1] : Sarcoma \par right hip and right leg pain described as a pressure sensation, constant, worsened by activity with prolonged standing for hours. However, can perform ADLs.\par Not engaged in exercises. 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 \par Effexor 112 mgs qhs; Seroquel for sleep. BM ok takes senekot.\par  \par Co morbidities: sleep ok. at times leg throbs and wakes her up.\par \par

## 2022-04-10 NOTE — PHYSICAL EXAM
[FreeTextEntry1] : Constitutional: No signs of distress or signs of toxicity. \par Mental Status: Alert and well oriented. Speech fluent. No aphasia. Fund of knowledge intact. \par Psychiatric: Mood stable.\par Cranial Nerve: PERRLA: No papilledema; No VFC: No Kristina. V1-3 intact. No facial asymmetry, hearing grossly intact; palate elevates symmetrically, tongue midline\par Motor:No involuntary movements noted.  Adequate bulk, tone throughout, 5/5 strength of all muscle groups \par DTR: present and symmetrical; no clonus, plantars  downgoing\par Sensory: intact to primary and secondary modalities; neg Romberg\par Cerebellar: adequate finger to nose and eel to shin bilaterally.\par Gait: non antalgic or ataxic.\par Eyes: no redness or swelling\par HEENT: intact; no signs of trauma.\par Neck: No masses noted\par Pulmonary: no respiratory distress\par Vascular: no temperature, color change or sudomotor changes.; no edema\par Musculoskeletal: examination: right hip scar  of the cervical spine reveals no midline tenderness, range of motion full upon flexion, extension and lateral rotation. Negative facet tenderness, Negative Spurlings bilaterally. examination of the lumbar spine reveals no midline or paraspinal tenderness; Range of motion full upon flexion, extension and lateral rotation; negative facet loading, No tenderness of sciatic notch, No tenderness of bilateral greater trochanters, Negative SHAZIA, negative SLRT bilaterally,\par Abd: non tender\par Skin: No rash.\par

## 2022-04-10 NOTE — ASSESSMENT
[FreeTextEntry1] : Chronic pain syndrome\par dc methadone\par UDT\par \par ISTOP reviewed\par Will increase effexor 150 mga qd. Advised of AEs.\par

## 2022-04-28 ENCOUNTER — NON-APPOINTMENT (OUTPATIENT)
Age: 57
End: 2022-04-28

## 2022-04-28 RX ORDER — CLONIDINE HYDROCHLORIDE 0.1 MG/1
0.1 TABLET ORAL TWICE DAILY
Qty: 30 | Refills: 0 | Status: ACTIVE | COMMUNITY
Start: 2022-04-28 | End: 1900-01-01

## 2022-05-03 ENCOUNTER — APPOINTMENT (OUTPATIENT)
Dept: MRI IMAGING | Facility: CLINIC | Age: 57
End: 2022-05-03
Payer: COMMERCIAL

## 2022-05-03 ENCOUNTER — APPOINTMENT (OUTPATIENT)
Dept: CT IMAGING | Facility: CLINIC | Age: 57
End: 2022-05-03
Payer: COMMERCIAL

## 2022-05-03 ENCOUNTER — OUTPATIENT (OUTPATIENT)
Dept: OUTPATIENT SERVICES | Facility: HOSPITAL | Age: 57
LOS: 1 days | End: 2022-05-03
Payer: COMMERCIAL

## 2022-05-03 DIAGNOSIS — C49.9 MALIGNANT NEOPLASM OF CONNECTIVE AND SOFT TISSUE, UNSPECIFIED: ICD-10-CM

## 2022-05-03 DIAGNOSIS — Z98.89 OTHER SPECIFIED POSTPROCEDURAL STATES: Chronic | ICD-10-CM

## 2022-05-03 DIAGNOSIS — Z00.8 ENCOUNTER FOR OTHER GENERAL EXAMINATION: ICD-10-CM

## 2022-05-03 DIAGNOSIS — Z90.721 ACQUIRED ABSENCE OF OVARIES, UNILATERAL: Chronic | ICD-10-CM

## 2022-05-03 DIAGNOSIS — R91.1 SOLITARY PULMONARY NODULE: ICD-10-CM

## 2022-05-03 DIAGNOSIS — C49.9 MALIGNANT NEOPLASM OF CONNECTIVE AND SOFT TISSUE, UNSPECIFIED: Chronic | ICD-10-CM

## 2022-05-03 PROCEDURE — 71250 CT THORAX DX C-: CPT | Mod: 26

## 2022-05-03 PROCEDURE — 73723 MRI JOINT LWR EXTR W/O&W/DYE: CPT | Mod: 26,RT

## 2022-05-03 PROCEDURE — A9585: CPT

## 2022-05-03 PROCEDURE — 73723 MRI JOINT LWR EXTR W/O&W/DYE: CPT

## 2022-05-03 PROCEDURE — 71250 CT THORAX DX C-: CPT

## 2022-05-24 RX ORDER — METHADONE HYDROCHLORIDE 10 MG/1
10 TABLET ORAL
Qty: 15 | Refills: 0 | Status: DISCONTINUED | COMMUNITY
Start: 2020-01-13 | End: 2022-05-24

## 2022-06-24 ENCOUNTER — APPOINTMENT (OUTPATIENT)
Dept: PAIN MANAGEMENT | Facility: CLINIC | Age: 57
End: 2022-06-24
Payer: COMMERCIAL

## 2022-06-24 PROCEDURE — 99212 OFFICE O/P EST SF 10 MIN: CPT | Mod: 95

## 2022-06-24 NOTE — ASSESSMENT
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 \par I-Stop reviewed,  reference #: [id number]\par No signs of aberrant behavior and will continue to monitor for signs of toxicity.\par Reminded to continue to avoid alcohol.\par \par I am seeing GUILHERME MACKENZIE as incident to service. Dr. Mcintyre is present in the office suite immediately available and able to provide assistance and direction throughout the time the service was performed.\par \par

## 2022-06-24 NOTE — REASON FOR VISIT
[Follow-Up: _____] : a [unfilled] follow-up visit [Home] : at home, [unfilled] , at the time of the visit. [Medical Office: ([facility])___] : at the medical office located in  [Verbal consent obtained from patient] : the patient, [unfilled]

## 2022-06-24 NOTE — REASON FOR VISIT
[Follow-Up: _____] : a [unfilled] follow-up visit [Home] : at home, [unfilled] , at the time of the visit. [Medical Office: ([facility])___] : at the medical office located in  [Verbal consent obtained from patient] : the patient, [unfilled]

## 2022-06-24 NOTE — ASSESSMENT
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 \par I-Stop reviewed,  reference #: [id number]\par No signs of aberrant behavior and will continue to monitor for signs of toxicity.\par Reminded to continue to avoid alcohol.\par \par I am seeing GUILHERME MACKENZIE as incident to service. Dr. Mcintyre is present in the office suite immediately available and able to provide assistance and direction throughout the time the service was performed.\par \par

## 2022-07-28 ENCOUNTER — RX RENEWAL (OUTPATIENT)
Age: 57
End: 2022-07-28

## 2022-10-06 ENCOUNTER — APPOINTMENT (OUTPATIENT)
Dept: PAIN MANAGEMENT | Facility: CLINIC | Age: 57
End: 2022-10-06

## 2022-10-06 VITALS
HEART RATE: 73 BPM | DIASTOLIC BLOOD PRESSURE: 71 MMHG | BODY MASS INDEX: 24.73 KG/M2 | SYSTOLIC BLOOD PRESSURE: 120 MMHG | WEIGHT: 131 LBS | HEIGHT: 61 IN

## 2022-10-06 PROCEDURE — 99214 OFFICE O/P EST MOD 30 MIN: CPT

## 2022-10-06 RX ORDER — NALOXONE HYDROCHLORIDE 4 MG/.1ML
4 SPRAY NASAL
Qty: 1 | Refills: 1 | Status: ACTIVE | COMMUNITY
Start: 2022-10-06 | End: 1900-01-01

## 2022-10-06 NOTE — HISTORY OF PRESENT ILLNESS
[FreeTextEntry1] : Since last visit, patient reports status quo in regards to pain. The BARTOLO 2/10 on average with occ spikes of pain is "walking a lot" 2 times per week. Treats it by elevated right leg wo increase in medication. \par \par No new medical problems.\par \par

## 2022-10-06 NOTE — PHYSICAL EXAM
[General Appearance - Alert] : alert [Oriented To Time, Place, And Person] : oriented to person, place, and time [Affect] : the affect was normal [Mood] : the mood was normal [FreeTextEntry1] : Constitutional: No signs of distress or signs of toxicity. \par Mental Status: Alert and well oriented. Speech fluent. No aphasia. Fund of knowledge intact. \par Psychiatric: Mood stable.\par Gait: non antalgic or ataxic.\par Eyes: no redness or swelling\par HEENT: intact; no signs of trauma.\par Neck: No masses noted\par Pulmonary: no respiratory distress\par Skin: No rash.\par

## 2022-10-06 NOTE — PROCEDURE
[FreeTextEntry1] : Chronic pain syndrome\par \par Doing well with tapering of oxycodone. Denies any issues with tapering. \par Will prescribe #60 \par \par

## 2022-10-24 ENCOUNTER — APPOINTMENT (OUTPATIENT)
Dept: PAIN MANAGEMENT | Facility: CLINIC | Age: 57
End: 2022-10-24

## 2022-12-13 ENCOUNTER — APPOINTMENT (OUTPATIENT)
Dept: PAIN MANAGEMENT | Facility: CLINIC | Age: 57
End: 2022-12-13

## 2022-12-13 ENCOUNTER — NON-APPOINTMENT (OUTPATIENT)
Age: 57
End: 2022-12-13

## 2022-12-13 PROCEDURE — 99212 OFFICE O/P EST SF 10 MIN: CPT | Mod: 95

## 2022-12-13 NOTE — ASSESSMENT
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 Concerned about returning to work in person and commute from  to [facility] . \par \par will prescribe Oxycodone 5 mg 2-3x/day qty 70 \par Consider adding Non-Opioid pain meds \par I-Stop reviewed,  reference #: [id number]\par No signs of aberrant behavior and will continue to monitor for signs of toxicity.\par Reminded to continue to avoid alcohol.\par Safe storage of medication was reviewed. \par \par \par \par \par \par \par \par \par

## 2022-12-13 NOTE — HISTORY OF PRESENT ILLNESS
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  She continues to report pain daily described as a dull ache in right thigh and groin described as a dull ache 2/10 with current meds and 5/10 up to 5/10. Pain increased with activity, ambulation as well as damp, cold weather and decreased with leg elevation. \par She reports no withdrawal symptoms with the decrease in Oxycodone from qty 105to 60/month and feels at times she needs the an extras dose in the day but does not have enough. \par \par Prior meds: Methadone 5 mgs qhs\par Current meds include: Oxycodone 5 mg 2x/day qty 60, Effexor 37.5mg/75mg qam, Seroquel 25 mg q hs.  Ibuprofen prn. \par

## 2023-01-09 ENCOUNTER — APPOINTMENT (OUTPATIENT)
Dept: PAIN MANAGEMENT | Facility: CLINIC | Age: 58
End: 2023-01-09

## 2023-01-26 RX ORDER — VENLAFAXINE HYDROCHLORIDE 150 MG/1
150 CAPSULE, EXTENDED RELEASE ORAL
Qty: 90 | Refills: 3 | Status: DISCONTINUED | COMMUNITY
Start: 2021-08-24 | End: 2023-01-26

## 2023-02-11 ENCOUNTER — INPATIENT (INPATIENT)
Facility: HOSPITAL | Age: 58
LOS: 3 days | Discharge: HOME | DRG: 919 | End: 2023-02-15
Attending: INTERNAL MEDICINE | Admitting: INTERNAL MEDICINE
Payer: COMMERCIAL

## 2023-02-11 VITALS
DIASTOLIC BLOOD PRESSURE: 73 MMHG | RESPIRATION RATE: 18 BRPM | HEART RATE: 97 BPM | TEMPERATURE: 98 F | OXYGEN SATURATION: 100 % | WEIGHT: 138.01 LBS | HEIGHT: 61 IN | SYSTOLIC BLOOD PRESSURE: 133 MMHG

## 2023-02-11 DIAGNOSIS — Z98.89 OTHER SPECIFIED POSTPROCEDURAL STATES: Chronic | ICD-10-CM

## 2023-02-11 DIAGNOSIS — Z90.721 ACQUIRED ABSENCE OF OVARIES, UNILATERAL: Chronic | ICD-10-CM

## 2023-02-11 DIAGNOSIS — C49.9 MALIGNANT NEOPLASM OF CONNECTIVE AND SOFT TISSUE, UNSPECIFIED: Chronic | ICD-10-CM

## 2023-02-11 LAB
ALBUMIN SERPL ELPH-MCNC: 4.1 G/DL — SIGNIFICANT CHANGE UP (ref 3.3–5)
ALP SERPL-CCNC: 86 U/L — SIGNIFICANT CHANGE UP (ref 40–120)
ALT FLD-CCNC: 8 U/L — LOW (ref 10–45)
ANION GAP SERPL CALC-SCNC: 14 MMOL/L — SIGNIFICANT CHANGE UP (ref 5–17)
APTT BLD: 28.5 SEC — SIGNIFICANT CHANGE UP (ref 27.5–35.5)
AST SERPL-CCNC: 16 U/L — SIGNIFICANT CHANGE UP (ref 10–40)
BASOPHILS # BLD AUTO: 0.04 K/UL — SIGNIFICANT CHANGE UP (ref 0–0.2)
BASOPHILS NFR BLD AUTO: 0.4 % — SIGNIFICANT CHANGE UP (ref 0–2)
BILIRUB SERPL-MCNC: 0.4 MG/DL — SIGNIFICANT CHANGE UP (ref 0.2–1.2)
BLD GP AB SCN SERPL QL: NEGATIVE — SIGNIFICANT CHANGE UP
BUN SERPL-MCNC: 12 MG/DL — SIGNIFICANT CHANGE UP (ref 7–23)
CALCIUM SERPL-MCNC: 9.5 MG/DL — SIGNIFICANT CHANGE UP (ref 8.4–10.5)
CHLORIDE SERPL-SCNC: 102 MMOL/L — SIGNIFICANT CHANGE UP (ref 96–108)
CO2 SERPL-SCNC: 26 MMOL/L — SIGNIFICANT CHANGE UP (ref 22–31)
CREAT SERPL-MCNC: 0.74 MG/DL — SIGNIFICANT CHANGE UP (ref 0.5–1.3)
EGFR: 94 ML/MIN/1.73M2 — SIGNIFICANT CHANGE UP
EOSINOPHIL # BLD AUTO: 0.08 K/UL — SIGNIFICANT CHANGE UP (ref 0–0.5)
EOSINOPHIL NFR BLD AUTO: 0.9 % — SIGNIFICANT CHANGE UP (ref 0–6)
GLUCOSE SERPL-MCNC: 94 MG/DL — SIGNIFICANT CHANGE UP (ref 70–99)
HCT VFR BLD CALC: 34.7 % — SIGNIFICANT CHANGE UP (ref 34.5–45)
HGB BLD-MCNC: 11.2 G/DL — LOW (ref 11.5–15.5)
IMM GRANULOCYTES NFR BLD AUTO: 0.4 % — SIGNIFICANT CHANGE UP (ref 0–0.9)
INR BLD: 1.1 RATIO — SIGNIFICANT CHANGE UP (ref 0.88–1.16)
LIDOCAIN IGE QN: 16 U/L — SIGNIFICANT CHANGE UP (ref 7–60)
LYMPHOCYTES # BLD AUTO: 1.01 K/UL — SIGNIFICANT CHANGE UP (ref 1–3.3)
LYMPHOCYTES # BLD AUTO: 11 % — LOW (ref 13–44)
MCHC RBC-ENTMCNC: 29.1 PG — SIGNIFICANT CHANGE UP (ref 27–34)
MCHC RBC-ENTMCNC: 32.3 GM/DL — SIGNIFICANT CHANGE UP (ref 32–36)
MCV RBC AUTO: 90.1 FL — SIGNIFICANT CHANGE UP (ref 80–100)
MONOCYTES # BLD AUTO: 0.75 K/UL — SIGNIFICANT CHANGE UP (ref 0–0.9)
MONOCYTES NFR BLD AUTO: 8.2 % — SIGNIFICANT CHANGE UP (ref 2–14)
NEUTROPHILS # BLD AUTO: 7.28 K/UL — SIGNIFICANT CHANGE UP (ref 1.8–7.4)
NEUTROPHILS NFR BLD AUTO: 79.1 % — HIGH (ref 43–77)
NRBC # BLD: 0 /100 WBCS — SIGNIFICANT CHANGE UP (ref 0–0)
PLATELET # BLD AUTO: 220 K/UL — SIGNIFICANT CHANGE UP (ref 150–400)
POTASSIUM SERPL-MCNC: 4.1 MMOL/L — SIGNIFICANT CHANGE UP (ref 3.5–5.3)
POTASSIUM SERPL-SCNC: 4.1 MMOL/L — SIGNIFICANT CHANGE UP (ref 3.5–5.3)
PROT SERPL-MCNC: 6.9 G/DL — SIGNIFICANT CHANGE UP (ref 6–8.3)
PROTHROM AB SERPL-ACNC: 12.8 SEC — SIGNIFICANT CHANGE UP (ref 10.5–13.4)
RAPID RVP RESULT: SIGNIFICANT CHANGE UP
RBC # BLD: 3.85 M/UL — SIGNIFICANT CHANGE UP (ref 3.8–5.2)
RBC # FLD: 12.6 % — SIGNIFICANT CHANGE UP (ref 10.3–14.5)
RH IG SCN BLD-IMP: POSITIVE — SIGNIFICANT CHANGE UP
SARS-COV-2 RNA SPEC QL NAA+PROBE: SIGNIFICANT CHANGE UP
SODIUM SERPL-SCNC: 142 MMOL/L — SIGNIFICANT CHANGE UP (ref 135–145)
WBC # BLD: 9.2 K/UL — SIGNIFICANT CHANGE UP (ref 3.8–10.5)
WBC # FLD AUTO: 9.2 K/UL — SIGNIFICANT CHANGE UP (ref 3.8–10.5)

## 2023-02-11 PROCEDURE — 74019 RADEX ABDOMEN 2 VIEWS: CPT | Mod: 26

## 2023-02-11 PROCEDURE — 71046 X-RAY EXAM CHEST 2 VIEWS: CPT | Mod: 26

## 2023-02-11 PROCEDURE — 99223 1ST HOSP IP/OBS HIGH 75: CPT

## 2023-02-11 PROCEDURE — 71275 CT ANGIOGRAPHY CHEST: CPT | Mod: 26,MD

## 2023-02-11 PROCEDURE — 74177 CT ABD & PELVIS W/CONTRAST: CPT | Mod: 26,MD

## 2023-02-11 RX ORDER — OXYCODONE HYDROCHLORIDE 5 MG/1
5 TABLET ORAL
Refills: 0 | Status: DISCONTINUED | OUTPATIENT
Start: 2023-02-11 | End: 2023-02-12

## 2023-02-11 RX ORDER — CELECOXIB 200 MG/1
200 CAPSULE ORAL DAILY
Refills: 0 | Status: DISCONTINUED | OUTPATIENT
Start: 2023-02-11 | End: 2023-02-12

## 2023-02-11 RX ORDER — VENLAFAXINE HCL 75 MG
75 CAPSULE, EXT RELEASE 24 HR ORAL
Refills: 0 | Status: DISCONTINUED | OUTPATIENT
Start: 2023-02-11 | End: 2023-02-12

## 2023-02-11 RX ORDER — VENLAFAXINE HCL 75 MG
37.5 CAPSULE, EXT RELEASE 24 HR ORAL
Refills: 0 | Status: DISCONTINUED | OUTPATIENT
Start: 2023-02-11 | End: 2023-02-12

## 2023-02-11 RX ORDER — MORPHINE SULFATE 50 MG/1
4 CAPSULE, EXTENDED RELEASE ORAL ONCE
Refills: 0 | Status: DISCONTINUED | OUTPATIENT
Start: 2023-02-11 | End: 2023-02-11

## 2023-02-11 RX ORDER — QUETIAPINE FUMARATE 200 MG/1
25 TABLET, FILM COATED ORAL AT BEDTIME
Refills: 0 | Status: DISCONTINUED | OUTPATIENT
Start: 2023-02-11 | End: 2023-02-12

## 2023-02-11 RX ORDER — ATORVASTATIN CALCIUM 80 MG/1
10 TABLET, FILM COATED ORAL AT BEDTIME
Refills: 0 | Status: DISCONTINUED | OUTPATIENT
Start: 2023-02-11 | End: 2023-02-12

## 2023-02-11 RX ADMIN — MORPHINE SULFATE 4 MILLIGRAM(S): 50 CAPSULE, EXTENDED RELEASE ORAL at 18:44

## 2023-02-11 NOTE — CONSULT NOTE ADULT - SUBJECTIVE AND OBJECTIVE BOX
GUILHERME MACKENZIE [id number]  57y Female      HPI:  57F presented with LUQ abdominal pain since colonoscopy on Tuesday not resolving. Colonoscopy otherwise normal. Normal bowel function. Normal PO tolerance and intake. No nausea or vomiting. 1 episode of fever at home yesterday. 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 Patient follows Dr Lynch yearly for surveillance. Workup significant for 3.3cm splenic subcapsular hematoma.    PAST MEDICAL & SURGICAL HISTORY:  Sarcoma  right groin- - radiation  reoccurence       Smoker      Depression      Hyperlipidemia      Fibroid, uterine      Post partum depression      Small bowel obstruction due to adhesions       novel coronavirus disease (COVID-19)      COVID-19 vaccine series completed      S/P appendectomy        S/P lumpectomy, right breast  benign       S/P tonsillectomy        S/P myomectomy        S/P ovarian cystectomy        S/P oophorectomy        S/P  section        Sarcoma  s/p removal   surgery 10/17/2014  Radiation afterwards            MEDICATIONS  (STANDING):    MEDICATIONS  (PRN):      Allergies    Invanz (Rash)  vancomycin (Rash)    Intolerances        REVIEW OF SYSTEMS    [x ] A ten-point review of systems was otherwise negative except as noted.  [ ] Due to altered mental status/intubation, subjective information were not able to be obtained from the patient. History was obtained, to the extent possible, from review of the chart and collateral sources of information.      Vital Signs Last 24 Hrs  T(C): 37.8 (2023 19:37), Max: 37.8 (2023 19:37)  T(F): 100 (2023 19:37), Max: 100 (2023 19:37)  HR: 95 (2023 19:37) (95 - 98)  BP: 117/74 (2023 19:37) (103/69 - 133/73)  BP(mean): --  RR: 18 (2023 19:37) (18 - 19)  SpO2: 99% (2023 19:37) (97% - 100%)    Parameters below as of 2023 19:37  Patient On (Oxygen Delivery Method): room air        PHYSICAL EXAM:  GENERAL: NAD, well-appearing  CHEST/LUNG: Clear to auscultation bilaterally  HEART: Regular rate and rhythm  ABDOMEN: Soft, moderate LUQ tenderness, well healed midline laparotomy scar  EXTREMITIES:  No clubbing, cyanosis, or edema      LABS:  Labs:  CAPILLARY BLOOD GLUCOSE                              11.2   9.20  )-----------( 220      ( 2023 19:07 )             34.7       Auto Neutrophil %: 79.1 % (23 @ 19:07)  Auto Immature Granulocyte %: 0.4 % (23 @ 19:07)        142  |  102  |  12  ----------------------------<  94  4.1   |  26  |  0.74      Calcium, Total Serum: 9.5 mg/dL (23 @ 19:07)      LFTs:             6.9  | 0.4  | 16       ------------------[86      ( 2023 19:07 )  4.1  | x    | 8           Lipase:16     Amylase:x             Coags:     12.8   ----< 1.10    ( 2023 19:07 )     28.5       RADIOLOGY & ADDITIONAL STUDIES:  < from: CT Angio Chest PE Protocol w/ IV Cont (23 @ 20:24) >  IMPRESSION:  No pulmonary embolism.    No acute parenchymal or pleural lung disease.    Moderate sized pericardial effusion which has increased in size compared   with prior exam from 5/3/2022, measuring up to 1.4 cm in thickness over   the left lateral ventricle and 1.6 cm in thickness over the right lateral   ventricle.    Splenic subcapsular hematoma measuring up to 3.3 cm in maximum thickness   with small volume of perisplenic hematoma and hemoperitoneum in the   pelvis.    Cholelithiasis.      Findings were discussed byDr. Johnson with  Dr. Alexx GALLEGOS Attending on   2023 8:54 PM with read back confirmation.    < end of copied text >

## 2023-02-11 NOTE — ED ADULT NURSE NOTE - OBJECTIVE STATEMENT
57y F AXO3 PMH of sarcoma and small bowel obstruction and PSH of  arrived to the ED c/o abdominal pain. Pt states she received a colonoscopy last Tuesday and states pain began on Wednesday. Pt endorses fever last night and states pain radiates to upper left back. Pt reports taking prescribed Oxycodone PO and found no relief. Upon arrival to the ED, the pt is well appearing, has unlabored, even and bilateral chest rise, and is ambulatory with steady gait. Upon assessment, pt has even and bilateral peripheral pulses, ROM on all extremities, and has a rigid and distended abdomen. Upon palpation, the pt has abdominal tenderness on the upper left quadrant. Pt denies blood thinners, SOB, chest pain, n/v/d, numbness, and tingling. IV access obtained. Comfort and safety provided.

## 2023-02-11 NOTE — CONSULT NOTE ADULT - ASSESSMENT
Assessment:  57y Female with hx of right groin sarcoma s/p radiation and excision, SBO s/p ex lap, SBR, primary anastomosis. Here with 4 days of LUQ pain, after colonoscopy. Vitals and labs stable. Imaging shows subcapsular splenic hematoma.    Plan:  - no acute surgical intervention at this time  - recommend observation and monitoring in CDU  - repeat labs in AM, likely ok to go home if remains stable  - plan dw attending    Surgery  [id number]
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
## 2023-02-11 NOTE — ED PROVIDER NOTE - NSICDXPASTMEDICALHX_GEN_ALL_CORE_FT
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

## 2023-02-11 NOTE — ED PROVIDER NOTE - CLINICAL SUMMARY MEDICAL DECISION MAKING FREE TEXT BOX
Veronica Reyes MD, PGY-3: 57y F hx HLD, Uterine fibroids, S/P resection, Sarcoma (inactive), S/p resection/RT, SBO p/w LUQ pain and fever s/p colonoscopy.   Vital signs stable, physical exam left upper quadrant tenderness to palpation.  Given recent colonoscopy, concern for perforated bowel, also consider PE given pain is pleuritic.  Also consider pneumonia given location.  Plan for CT angio chest, CT abdomen pelvis, upright x-rays, basic labs.

## 2023-02-11 NOTE — ED CDU PROVIDER INITIAL DAY NOTE - WR ORDER DATE AND TIME 1
Glenda Bales is a 72 year old patient who comes in today for a Blood Pressure check.  Initial BP:  /80 (BP Location: Left arm, Patient Position: Sitting, Cuff Size: Adult Large)   Pulse 60      60  Disposition: Patient states her blood pressure has been running high in the mornings.  Systolic has been in the 150's per patient's at home machine.  Patient will bring in her machine and have us to check her blood pressure for accuracy.  Results routed to provider.    Migdalia Montez MA      
11-Feb-2023 17:34

## 2023-02-11 NOTE — ED CDU PROVIDER DISPOSITION NOTE - CLINICAL COURSE
57y F hx HLD, Uterine fibroids, S/P resection, Sarcoma (inactive), S/p resection/RT, SBO p/w LUQ pain. Patient had colonoscopy on Tuesday, endorsed onset of lower abdominal pain radiating to the back on Wednesday. Also endorses that pain is pleuritic, denies current fevers, nausea, vomiting, decreased p.o. intake.   Endorses fever x1 day.  Denies cough, dysuria.  While in the ED CT CAP showed a splenic hematoma, surgery was consulted and recommended CDU for serial abdominal exams, repeat CBC in the morning.  Incidentally a moderate sized pericardial effusion was seen.  Pt denies any CP, palp, or SoB.  VS normal, will get TTE tomorrow to evaluate for ultrasonographic evidence of tamponade.    CDU course Seen by marin Naylor who recommended admission for to trend effusion in two days for repeat TTE to assess if drainage is warranted. Recommended admit to refoua who accepted pt. ED attending Dr. Hammer aware of above and agreed

## 2023-02-11 NOTE — ED CDU PROVIDER INITIAL DAY NOTE - OBJECTIVE STATEMENT
57y F hx HLD, Uterine fibroids, S/P resection, Sarcoma (inactive), S/p resection/RT, SBO p/w LUQ pain. Patient had colonoscopy on Tuesday, endorsed onset of lower abdominal pain radiating to the back on Wednesday. Also endorses that pain is pleuritic, denies current fevers, nausea, vomiting, decreased p.o. intake.   Endorses fever x1 day.  Denies cough, dysuria.  While in the ED CT CAP showed a splenic hematoma, surgery was consulted and recommended CDU for serial abdominal exams, repeat CBC in the morning.  Incidentally a moderate sized pericardial effusion was seen.  Pt denies any CP, palp, or SoB.  VS normal, will get TTE tomorrow to evaluate for ultrasonographic evidence of tamponade.

## 2023-02-11 NOTE — ED CDU PROVIDER INITIAL DAY NOTE - CLINICAL SUMMARY MEDICAL DECISION MAKING FREE TEXT BOX
PT adult female sp colonoscopy three days ago pw c/o abd pain tenderness. CT +splenic hematoma. Pericardial effusion. CDU for serial exam/hct. TTE and cards cs for effusion.

## 2023-02-11 NOTE — ED CDU PROVIDER INITIAL DAY NOTE - DETAILS
Splenic Hematoma and Pericardial Effusion  1- FRequent reevaluations  2- Tele  3- Serial abdominal exams  4- CBC in the AM  5- TTE

## 2023-02-11 NOTE — ED PROVIDER NOTE - ATTENDING CONTRIBUTION TO CARE
Private Physician Nikolas Lynch Surg/Onc. Nikolas Rodriguez PCP/[facility]  57y female pmh HLD, Uterine fibroids, SP resection, Sarcoma rt groin x2, Sp resection/RT, SBO 5y ago felt to be 2/2 rt w lysis of adhesions, Depression. +current tobacco. Pt comes to ed sp colonoscopy 4d ago as opt. Following day had c/o abd pain. Yesterday developed fever tmax 101. No cough, NVD, Gi bleeding, No dysuria, hematuria, Abd pain left upper quad and left lower chest, no hemoptysis. No recent travel. Private Physician Nikolas Lynch Surg/Onc. Nikolas Rodriguez PCP/[facility]  57y female pmh HLD, Uterine fibroids, SP resection, Sarcoma rt groin x2, Sp resection/RT, SBO 5y ago felt to be 2/2 rt w lysis of adhesions, Depression. +current tobacco. Pt comes to ed sp colonoscopy 4d ago as opt. Following day had c/o abd pain. Yesterday developed fever tmax 101. No cough, NVD, Gi bleeding, No dysuria, hematuria, Abd pain left upper quad and left lower chest, no hemoptysis. No recent travel. PE WDWN female awake alert normocephalic atraumatic neck supple chest clear anterior & posterior cv no rubs, gallops or murmurs abd ttp left upper quad +rebound. +bs neruo no focal defects  Eduardo Coffey MD, Facep

## 2023-02-11 NOTE — ED PROVIDER NOTE - NSICDXPASTSURGICALHX_GEN_ALL_CORE_FT
PAST SURGICAL HISTORY:  S/P appendectomy 1972    S/P  section     S/P lumpectomy, right breast benign     S/P myomectomy     S/P oophorectomy     S/P ovarian cystectomy     S/P tonsillectomy 1992    Sarcoma s/p removal 2011  surgery 10/17/2014  Radiation afterwards

## 2023-02-11 NOTE — ED PROVIDER NOTE - PHYSICAL EXAMINATION
Gen: WDWN, NAD  HEENT: EOMI, no nasal discharge, mucous membranes mildy dry  CV: RRR, 2+ radial pulses L  Resp:  no accessory muscle use, no increased work of breathing  GI: Abdomen soft non-distended, + LUQ ttp  MSK: No open wounds, no bruising, no LE edema  Neuro: A&Ox4, following commands, moving all four extremities spontaneously  Psych: appropriate mood

## 2023-02-11 NOTE — ED PROVIDER NOTE - NSICDXFAMILYHX_GEN_ALL_CORE_FT
FAMILY HISTORY:  Sibling  Still living? No  Family history of brain cancer, Age at diagnosis: Age Unknown  Family history of thyroid cancer, Age at diagnosis: Age Unknown

## 2023-02-11 NOTE — ED ADULT NURSE NOTE - NS ED NURSE PATIENT LEFT UNIT TIME
Phone: Tameka Ambriz      Fax: [phone]                            Outpatient Physical Therapy                                                                            Daily Note    Date: 2018  Patient Name: French Saleh        MRN: [id number]   ACCT#:  [de-identified]  : 1956  (57 y.o.)    Referring Practitioner: Dr Linda Camarena    Referral Date : 18    Diagnosis: S/P right TKA  Treatment Diagnosis: Right knee pain S/P TKA    Onset Date: 18  PT Insurance Information: [employer]/ [employer]  Total # of Visits Approved: 18 Per Physician Order  Total # of Visits to Date: 5    Pre-Treatment Pain:  5/10     Assessment  Assessment: AROM right knee 0-92 degrees. Self PROM in sitting 96 degrees flexion. Worked on gait training in [email or web address] bars with using only one hand with cueing for heel to toe pattern and trying to get right knee to flex with push off. Plan to work on gait with cane next visit. Chart Reviewed: Yes    Plan  Plan: Continue with current plan    Exercises/Modalities/Manual:  See DocFlow Sheet    Education:           Goals  (Total # of Visits to Date: 5)   Short Term Goals - Time Frame for Short term goals: 9  Short term goal 1: Increase ROM right knee 100 degrees to get in and out of car without difficulty  Short term goal 2:  Increase strength right knee extension 4/5 to progress gait             Long Term Goals - Time Frame for Long term goals : 25  Long term goal 1: Gait independent without device with minimal gait deviation  Long term goal 2: Patient to have increase ROM right knee flexion 110 degrees to alternate feet up and down stairs WFL  Long term goal 3: Improve functional mobility 50/80  or better on LEFS          Post Treatment Pain:  /10    Time In: 10:30    Time Out : 11:15        Timed Code Treatment Minutes: 45 Minutes  Total Treatment Time: [facility] Therapy License Number: [id number]    Date: 2018 17:38

## 2023-02-11 NOTE — ED ADULT TRIAGE NOTE - CHIEF COMPLAINT QUOTE
s/p colonoscopy Tuesday; now c/o left sided abdominal pain going to the back area since Wednesday  fever last night

## 2023-02-11 NOTE — ED PROVIDER NOTE - OBJECTIVE STATEMENT
57y F hx HLD, Uterine fibroids, S/P resection, Sarcoma (inactive), S/p resection/RT, SBO p/w LUQ pain. Patient had colonoscopy on Tuesday, endorsed onset of lower abdominal pain radiating to the back on Wednesday. Also endorses that pain is pleuritic, denies current fevers, nausea, vomiting, decreased p.o. intake.   Endorses fever x1 day.  Denies cough, dysuria.

## 2023-02-11 NOTE — ED PROCEDURE NOTE - PROCEDURE SUPERVISED BY
[email or web address] ,[email or web address],DirectAddress_Unknown,DirectAddress_Unknown

## 2023-02-11 NOTE — ED CDU PROVIDER DISPOSITION NOTE - ATTENDING APP SHARED VISIT CONTRIBUTION OF CARE
Attending MD Hammer:   I personally have seen and examined this patient.  Physician assistant note reviewed and agree on plan of care and except where noted.  See below for details.     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  Patient reports persistent but improved LUQ pain.  Denies vomiting, diarrhea.  Denies chest pain, shortness of breath.      Exam:   General: NAD  HENT: head NCAT, airway patent  Eyes: anicteric, no conjunctival injection   Lungs: lungs CTAB with good inspiratory effort, no wheezing, no rhonchi, no rales  Cardiac: +S1S2, no obvious m/r/g  GI: abdomen soft with +BS, +LUQ tenderness, no rebound, no guarding, ND, well healed surgical incision  : no CVAT  MSK: FROM at neck, no calf tenderness, swelling, erythema or warmth   Neuro: moving all extremities spontaneously, nonfocal  Psych: normal mood and affect     A/P: 57F with moderate pericardial effusion, splenic subcapsular hematoma, LUQ abdominal pain, evaluated by surgery with no plan for acute intervention, evaluated by cardiology, recommended admission, will admit

## 2023-02-11 NOTE — ED ADULT NURSE NOTE - PAIN: NONVERBAL INDICATORS
guarding/moaning/inability to perform BADLs/inability to perform IADLs/activity pattern change/sleep/rest inability

## 2023-02-11 NOTE — ED PROCEDURE NOTE - NS ED ATTENDING STATEMENT MOD
This was a shared visit with the LARS. I reviewed and verified the documentation and independently performed the documented: all other ROS negative except as per HPI

## 2023-02-11 NOTE — ED CDU PROVIDER INITIAL DAY NOTE - ATTENDING CONTRIBUTION TO CARE
I have personally performed a face to face diagnostic evaluation on this patient.  I have reviewed the ACP note and agree with the history, exam, and plan of care, except as noted.  History and Exam by me shows  See ED Provider note  Eduardo Coffey MD, Facep

## 2023-02-12 ENCOUNTER — TRANSCRIPTION ENCOUNTER (OUTPATIENT)
Age: 58
End: 2023-02-12

## 2023-02-12 DIAGNOSIS — D78.02: ICD-10-CM

## 2023-02-12 LAB
APPEARANCE UR: CLEAR — SIGNIFICANT CHANGE UP
BACTERIA # UR AUTO: NEGATIVE — SIGNIFICANT CHANGE UP
BILIRUB UR-MCNC: NEGATIVE — SIGNIFICANT CHANGE UP
COLOR SPEC: SIGNIFICANT CHANGE UP
DIFF PNL FLD: ABNORMAL
EPI CELLS # UR: 0 /HPF — SIGNIFICANT CHANGE UP
GLUCOSE UR QL: NEGATIVE — SIGNIFICANT CHANGE UP
HCT VFR BLD CALC: 32.9 % — LOW (ref 34.5–45)
HGB BLD-MCNC: 10.8 G/DL — LOW (ref 11.5–15.5)
HYALINE CASTS # UR AUTO: 1 /LPF — SIGNIFICANT CHANGE UP (ref 0–2)
KETONES UR-MCNC: ABNORMAL
LEUKOCYTE ESTERASE UR-ACNC: NEGATIVE — SIGNIFICANT CHANGE UP
MCHC RBC-ENTMCNC: 29 PG — SIGNIFICANT CHANGE UP (ref 27–34)
MCHC RBC-ENTMCNC: 32.8 GM/DL — SIGNIFICANT CHANGE UP (ref 32–36)
MCV RBC AUTO: 88.4 FL — SIGNIFICANT CHANGE UP (ref 80–100)
NITRITE UR-MCNC: NEGATIVE — SIGNIFICANT CHANGE UP
NRBC # BLD: 0 /100 WBCS — SIGNIFICANT CHANGE UP (ref 0–0)
PH UR: 6.5 — SIGNIFICANT CHANGE UP (ref 5–8)
PLATELET # BLD AUTO: 206 K/UL — SIGNIFICANT CHANGE UP (ref 150–400)
PROT UR-MCNC: ABNORMAL
RBC # BLD: 3.72 M/UL — LOW (ref 3.8–5.2)
RBC # FLD: 12.5 % — SIGNIFICANT CHANGE UP (ref 10.3–14.5)
RBC CASTS # UR COMP ASSIST: 24 /HPF — HIGH (ref 0–4)
SP GR SPEC: >1.05 (ref 1.01–1.02)
UROBILINOGEN FLD QL: NEGATIVE — SIGNIFICANT CHANGE UP
WBC # BLD: 7.06 K/UL — SIGNIFICANT CHANGE UP (ref 3.8–10.5)
WBC # FLD AUTO: 7.06 K/UL — SIGNIFICANT CHANGE UP (ref 3.8–10.5)
WBC UR QL: 14 /HPF — HIGH (ref 0–5)

## 2023-02-12 PROCEDURE — 99238 HOSP IP/OBS DSCHRG MGMT 30/<: CPT

## 2023-02-12 RX ORDER — MELOXICAM 15 MG/1
1 TABLET ORAL
Qty: 0 | Refills: 0 | DISCHARGE

## 2023-02-12 RX ORDER — ACETAMINOPHEN 500 MG
1000 TABLET ORAL ONCE
Refills: 0 | Status: DISCONTINUED | OUTPATIENT
Start: 2023-02-12 | End: 2023-02-15

## 2023-02-12 RX ORDER — ATORVASTATIN CALCIUM 80 MG/1
10 TABLET, FILM COATED ORAL AT BEDTIME
Refills: 0 | Status: DISCONTINUED | OUTPATIENT
Start: 2023-02-12 | End: 2023-02-15

## 2023-02-12 RX ORDER — VENLAFAXINE HCL 75 MG
37.5 CAPSULE, EXT RELEASE 24 HR ORAL DAILY
Refills: 0 | Status: DISCONTINUED | OUTPATIENT
Start: 2023-02-12 | End: 2023-02-15

## 2023-02-12 RX ORDER — VENLAFAXINE HCL 75 MG
75 CAPSULE, EXT RELEASE 24 HR ORAL DAILY
Refills: 0 | Status: DISCONTINUED | OUTPATIENT
Start: 2023-02-12 | End: 2023-02-15

## 2023-02-12 RX ORDER — VENLAFAXINE HCL 75 MG
1 CAPSULE, EXT RELEASE 24 HR ORAL
Qty: 0 | Refills: 0 | DISCHARGE

## 2023-02-12 RX ORDER — ACETAMINOPHEN 500 MG
1000 TABLET ORAL ONCE
Refills: 0 | Status: COMPLETED | OUTPATIENT
Start: 2023-02-12 | End: 2023-02-12

## 2023-02-12 RX ORDER — QUETIAPINE FUMARATE 200 MG/1
25 TABLET, FILM COATED ORAL ONCE
Refills: 0 | Status: COMPLETED | OUTPATIENT
Start: 2023-02-12 | End: 2023-02-12

## 2023-02-12 RX ORDER — CELECOXIB 200 MG/1
200 CAPSULE ORAL ONCE
Refills: 0 | Status: COMPLETED | OUTPATIENT
Start: 2023-02-12 | End: 2023-02-12

## 2023-02-12 RX ORDER — SENNA PLUS 8.6 MG/1
1 TABLET ORAL
Qty: 0 | Refills: 0 | DISCHARGE

## 2023-02-12 RX ORDER — VENLAFAXINE HCL 75 MG
37.5 CAPSULE, EXT RELEASE 24 HR ORAL DAILY
Refills: 0 | Status: DISCONTINUED | OUTPATIENT
Start: 2023-02-12 | End: 2023-02-12

## 2023-02-12 RX ORDER — QUETIAPINE FUMARATE 200 MG/1
1 TABLET, FILM COATED ORAL
Qty: 0 | Refills: 0 | DISCHARGE

## 2023-02-12 RX ORDER — OXYCODONE HYDROCHLORIDE 5 MG/1
5 TABLET ORAL ONCE
Refills: 0 | Status: DISCONTINUED | OUTPATIENT
Start: 2023-02-12 | End: 2023-02-12

## 2023-02-12 RX ORDER — VENLAFAXINE HCL 75 MG
75 CAPSULE, EXT RELEASE 24 HR ORAL DAILY
Refills: 0 | Status: DISCONTINUED | OUTPATIENT
Start: 2023-02-12 | End: 2023-02-12

## 2023-02-12 RX ORDER — OXYCODONE HYDROCHLORIDE 5 MG/1
5 TABLET ORAL EVERY 12 HOURS
Refills: 0 | Status: DISCONTINUED | OUTPATIENT
Start: 2023-02-12 | End: 2023-02-15

## 2023-02-12 RX ADMIN — MORPHINE SULFATE 4 MILLIGRAM(S): 50 CAPSULE, EXTENDED RELEASE ORAL at 00:34

## 2023-02-12 RX ADMIN — QUETIAPINE FUMARATE 25 MILLIGRAM(S): 200 TABLET, FILM COATED ORAL at 01:34

## 2023-02-12 RX ADMIN — OXYCODONE HYDROCHLORIDE 5 MILLIGRAM(S): 5 TABLET ORAL at 01:38

## 2023-02-12 RX ADMIN — OXYCODONE HYDROCHLORIDE 5 MILLIGRAM(S): 5 TABLET ORAL at 06:07

## 2023-02-12 RX ADMIN — OXYCODONE HYDROCHLORIDE 5 MILLIGRAM(S): 5 TABLET ORAL at 12:09

## 2023-02-12 RX ADMIN — ATORVASTATIN CALCIUM 10 MILLIGRAM(S): 80 TABLET, FILM COATED ORAL at 01:38

## 2023-02-12 RX ADMIN — OXYCODONE HYDROCHLORIDE 5 MILLIGRAM(S): 5 TABLET ORAL at 06:35

## 2023-02-12 RX ADMIN — Medication 1000 MILLIGRAM(S): at 11:00

## 2023-02-12 RX ADMIN — Medication 400 MILLIGRAM(S): at 10:30

## 2023-02-12 RX ADMIN — CELECOXIB 200 MILLIGRAM(S): 200 CAPSULE ORAL at 10:31

## 2023-02-12 RX ADMIN — Medication 75 MILLIGRAM(S): at 10:17

## 2023-02-12 RX ADMIN — ATORVASTATIN CALCIUM 10 MILLIGRAM(S): 80 TABLET, FILM COATED ORAL at 23:34

## 2023-02-12 RX ADMIN — CELECOXIB 200 MILLIGRAM(S): 200 CAPSULE ORAL at 11:00

## 2023-02-12 RX ADMIN — Medication 37.5 MILLIGRAM(S): at 10:17

## 2023-02-12 RX ADMIN — OXYCODONE HYDROCHLORIDE 5 MILLIGRAM(S): 5 TABLET ORAL at 18:22

## 2023-02-12 RX ADMIN — QUETIAPINE FUMARATE 25 MILLIGRAM(S): 200 TABLET, FILM COATED ORAL at 23:34

## 2023-02-12 NOTE — DISCHARGE NOTE PROVIDER - NSDCFUSCHEDAPPT_GEN_ALL_CORE_FT
[facility] [address] [facility]  Scheduled Appointment: 02/28/2023    Eduardo Mcintyre  [facility] [address] [facility]  Scheduled Appointment: 05/08/2023

## 2023-02-12 NOTE — H&P ADULT - NSHPPHYSICALEXAM_GEN_ALL_CORE
Vital Signs Last 24 Hrs  T(C): 36.8 (12 Feb 2023 08:42), Max: 37.8 (11 Feb 2023 19:37)  T(F): 98.3 (12 Feb 2023 08:42), Max: 100 (11 Feb 2023 19:37)  HR: 84 (12 Feb 2023 08:42) (84 - 98)  BP: 95/61 (12 Feb 2023 08:42) (95/61 - 133/73)  BP(mean): --  RR: 18 (12 Feb 2023 08:42) (17 - 19)  SpO2: 96% (12 Feb 2023 08:42) (96% - 100%)    Appearance: Normal	  HEENT:   Normal oral mucosa, PERRL, EOMI	  Lymphatic: No lymphadenopathy , no edema  Cardiovascular: Normal S1 S2, No JVD, No murmurs , Peripheral pulses palpable 2+ bilaterally  Respiratory: Lungs clear to auscultation, normal effort 	  Gastrointestinal:  Soft, Non-tender, + BS	  Skin: No rashes, No ecchymoses, No cyanosis, warm to touch  Musculoskeletal: Normal range of motion, normal strength  Psychiatry:  Mood & affect appropriate  Ext: No edema

## 2023-02-12 NOTE — ED CDU PROVIDER SUBSEQUENT DAY NOTE - PROGRESS NOTE DETAILS
+ LUQ tenderness mild without rebound or guarding, no increase in pain, no lightheadedness or dizziness.  NO CP or SoB, no events on tele.  VSS. PA Barretta: effusion enlarged compared to previous CT chest in sunrise. TTE performed pending results. cardiology Dr. Naylor at bedside pending recommendations WALE Kaplan: Seen by marin Naylor who recommended admission for to trend effusion in two days for repeat TTE to assess if drainage is warranted. Recommended admit to jazzy who accepted pt

## 2023-02-12 NOTE — CONSULT NOTE ADULT - SUBJECTIVE AND OBJECTIVE BOX
DATE OF SERVICE: 23 @ 14:34    CHIEF COMPLAINT:Patient is a 57y old  Female who presents with a chief complaint of LUQ pain     HISTORY OF PRESENT ILLNESS:HPI:  Patient is a 56 Y/O Female with PMhx of HLD, Uterine fibroids, S/P resection, Sarcoma (inactive), S/p resection/RT, SBO p/w LUQ pain. Patient had colonoscopy on Tuesday, endorsed onset of lower abdominal pain radiating to the back on Wednesday. Also endorses that pain is pleuritic, denies current fevers, nausea, vomiting, decreased p.o. intake.   Endorses fever x1 day.  Denies cough, dysuria. found ot have pericardial Effusion on CT  (2023 12:01)      PAST MEDICAL & SURGICAL HISTORY:  Sarcoma  right groin- - radiation  reoccurence       Smoker      Depression      Hyperlipidemia      Fibroid, uterine      Post partum depression      Small bowel obstruction due to adhesions       novel coronavirus disease (COVID-19)      COVID-19 vaccine series completed      S/P appendectomy        S/P lumpectomy, right breast  benign       S/P tonsillectomy        S/P myomectomy        S/P ovarian cystectomy        S/P oophorectomy        S/P  section        Sarcoma  s/p removal 2011  surgery 10/17/2014  Radiation afterwards              MEDICATIONS:        oxyCODONE    IR 5 milliGRAM(s) Oral every 12 hours  venlafaxine XR 75 milliGRAM(s) Oral daily  venlafaxine XR 37.5 milliGRAM(s) Oral daily      atorvastatin 10 milliGRAM(s) Oral at bedtime        FAMILY HISTORY:  Family history of brain cancer (Sibling)    Family history of thyroid cancer (Sibling)  endometrial ca        Non-contributory    SOCIAL HISTORY:    [ ] Tobacco  [ ] Drugs  [ ] Alcohol    Allergies    Invanz (Rash)  vancomycin (Rash)    Intolerances    	    REVIEW OF SYSTEMS:  CONSTITUTIONAL: No fever  EYES: No eye pain, visual disturbances, or discharge  ENMT:  No difficulty hearing, tinnitus  NECK: No pain or stiffness  RESPIRATORY: No cough, wheezing,  CARDIOVASCULAR: No chest pain, palpitations, passing out, dizziness, or leg swelling  GASTROINTESTINAL:  No nausea, vomiting, diarrhea or constipation. No melena.  GENITOURINARY: No dysuria, hematuria  NEUROLOGICAL: No stroke like symptoms  SKIN: No burning or lesions   ENDOCRINE: No heat or cold intolerance  MUSCULOSKELETAL: No joint pain or swelling  PSYCHIATRIC: No  anxiety, mood swings  HEME/LYMPH: No bleeding gums  ALLERGY AND IMMUNOLOGIC: No hives or eczema	    All other ROS negative    PHYSICAL EXAM:  T(C): 36.7 (23 @ 12:32), Max: 37.8 (23 @ 19:37)  HR: 93 (23 @ 12:32) (84 - 98)  BP: 109/52 (23 @ 12:32) (95/61 - 117/74)  RR: 18 (23 @ 12:32) (17 - 19)  SpO2: 97% (23 @ 12:32) (96% - 99%)  Wt(kg): --  I&O's Summary      Appearance: Normal	  HEENT:   Normal oral mucosa, EOMI	  Cardiovascular:  S1 S2, No JVD,    Respiratory: Lungs clear to auscultation	  Psychiatry: Alert  Gastrointestinal:  Soft, Non-tender, + BS	  Skin: No rashes   Neurologic: Non-focal  Extremities:  No edema  Vascular: Peripheral pulses palpable    	    	  	  CARDIAC MARKERS:  Labs personally reviewed by me                                  10.8   7.06  )-----------( 206      ( 2023 06:21 )             32.9     02-11    142  |  102  |  12  ----------------------------<  94  4.1   |  26  |  0.74    Ca    9.5      2023 19:07    TPro  6.9  /  Alb  4.1  /  TBili  0.4  /  DBili  x   /  AST  16  /  ALT  8<L>  /  AlkPhos  86            EKG: Personally reviewed by me - NSR no ischemic changes   Radiology: Personally reviewed by me - < from: CT Angio Chest PE Protocol w/ IV Cont (23 @ 20:24) >  Moderate sized pericardial effusion which has increased in size compared   with prior exam from 5/3/2022, measuring up to 1.4 cm in thickness over   the left lateral ventricle and 1.6 cm in thickness over the right lateral   ventricle.    Splenic subcapsular hematoma measuring up to 3.3 cm in maximum thickness   with small volume of perisplenic hematoma and hemoperitoneum in the   pelvis.      < end of copied text >        Assessment /Plan:   Patient is a 56 Y/O Female with PMhx of HLD, Uterine fibroids, S/P resection, Sarcoma (inactive), S/p resection/RT, SBO presenting with LUQ pain. Patient had colonoscopy on Tuesday, endorsed onset of lower abdominal pain radiating to the back on Wednesday. Also endorses that pain is pleuritic, denies current fevers, nausea, vomiting, decreased p.o. intake.    Denies cough, dysuria. Found to have pericardial effusion on CT       Problem/Plan- 1:  Problem: Pericardial effusion   Plan: TTE results pending to assess effusion  Repeat TTE in 48 hrs to assess stability of effusion   Hgb stable, cont to trend     Problem/Plan- 2:  Problem: Subcapsular splenic hematoma.  Plan: Hgb stable   No acute intervention per surg  [place] following     Problem/Plan- 3:  Problem: HLD   Plan: c/w Lipitor 10mg   obtain lipid panel    Differential diagnosis and plan of care discussed with patient after the evaluation. Counseling on diet, nutritional counseling, weight management, exercise and medication compliance was done.   Advanced care planning/advanced directives discussed with patient/family. DNR status including forceful chest compressions to attempt to restart the heart, ventilator support/artificial breathing, electric shock, artificial nutrition, health care proxy, Molst form all discussed with pt. Pt wishes to consider. More than fifteen minutes spent on discussing advanced directives.     PATRICIA Denny DO [facility]  Cardiovascular Medicine  [address] [place] [address], [address]  Office [phone]  Available via call or text on Microsoft Teams  DATE OF SERVICE: 23 @ 14:34    CHIEF COMPLAINT:Patient is a 57y old  Female who presents with a chief complaint of LUQ pain     HISTORY OF PRESENT ILLNESS:HPI:  Patient is a 56 Y/O Female with PMhx of HLD, Uterine fibroids, S/P resection, Sarcoma (inactive), S/p resection/RT, SBO p/w LUQ pain. Patient had colonoscopy on Tuesday, endorsed onset of lower abdominal pain radiating to the back on Wednesday. Also endorses that pain is pleuritic, denies current fevers, nausea, vomiting, decreased p.o. intake.   Endorses fever x1 day.  Denies cough, dysuria. found ot have pericardial Effusion on CT  (2023 12:01)      PAST MEDICAL & SURGICAL HISTORY:  Sarcoma  right groin- - radiation  reoccurence       Smoker      Depression      Hyperlipidemia      Fibroid, uterine      Post partum depression      Small bowel obstruction due to adhesions       novel coronavirus disease (COVID-19)      COVID-19 vaccine series completed      S/P appendectomy        S/P lumpectomy, right breast  benign       S/P tonsillectomy        S/P myomectomy        S/P ovarian cystectomy        S/P oophorectomy        S/P  section        Sarcoma  s/p removal 2011  surgery 10/17/2014  Radiation afterwards              MEDICATIONS:        oxyCODONE    IR 5 milliGRAM(s) Oral every 12 hours  venlafaxine XR 75 milliGRAM(s) Oral daily  venlafaxine XR 37.5 milliGRAM(s) Oral daily      atorvastatin 10 milliGRAM(s) Oral at bedtime        FAMILY HISTORY:  Family history of brain cancer (Sibling)    Family history of thyroid cancer (Sibling)  endometrial ca        Non-contributory    SOCIAL HISTORY:    [ ] Tobacco  [ ] Drugs  [ ] Alcohol    Allergies    Invanz (Rash)  vancomycin (Rash)    Intolerances    	    REVIEW OF SYSTEMS:  CONSTITUTIONAL: No fever  EYES: No eye pain, visual disturbances, or discharge  ENMT:  No difficulty hearing, tinnitus  NECK: No pain or stiffness  RESPIRATORY: No cough, wheezing,  CARDIOVASCULAR: No chest pain, palpitations, passing out, dizziness, or leg swelling  GASTROINTESTINAL:  No nausea, vomiting, diarrhea or constipation. No melena.  GENITOURINARY: No dysuria, hematuria  NEUROLOGICAL: No stroke like symptoms  SKIN: No burning or lesions   ENDOCRINE: No heat or cold intolerance  MUSCULOSKELETAL: No joint pain or swelling  PSYCHIATRIC: No  anxiety, mood swings  HEME/LYMPH: No bleeding gums  ALLERGY AND IMMUNOLOGIC: No hives or eczema	    All other ROS negative    PHYSICAL EXAM:  T(C): 36.7 (23 @ 12:32), Max: 37.8 (23 @ 19:37)  HR: 93 (23 @ 12:32) (84 - 98)  BP: 109/52 (23 @ 12:32) (95/61 - 117/74)  RR: 18 (23 @ 12:32) (17 - 19)  SpO2: 97% (23 @ 12:32) (96% - 99%)  Wt(kg): --  I&O's Summary      Appearance: Normal	  HEENT:   Normal oral mucosa, EOMI	  Cardiovascular:  S1 S2, No JVD,    Respiratory: Lungs clear to auscultation	  Psychiatry: Alert  Gastrointestinal:  Soft, Non-tender, + BS	  Skin: No rashes   Neurologic: Non-focal  Extremities:  No edema  Vascular: Peripheral pulses palpable    	    	  	  CARDIAC MARKERS:  Labs personally reviewed by me                                  10.8   7.06  )-----------( 206      ( 2023 06:21 )             32.9     02-11    142  |  102  |  12  ----------------------------<  94  4.1   |  26  |  0.74    Ca    9.5      2023 19:07    TPro  6.9  /  Alb  4.1  /  TBili  0.4  /  DBili  x   /  AST  16  /  ALT  8<L>  /  AlkPhos  86            EKG: Personally reviewed by me - NSR no ischemic changes   Radiology: Personally reviewed by me - < from: CT Angio Chest PE Protocol w/ IV Cont (23 @ 20:24) >  Moderate sized pericardial effusion which has increased in size compared   with prior exam from 5/3/2022, measuring up to 1.4 cm in thickness over   the left lateral ventricle and 1.6 cm in thickness over the right lateral   ventricle.    Splenic subcapsular hematoma measuring up to 3.3 cm in maximum thickness   with small volume of perisplenic hematoma and hemoperitoneum in the   pelvis.      < end of copied text >            Assessment /Plan:   Patient is a 56 Y/O Female with PMhx of HLD, Uterine fibroids, S/P resection, Sarcoma (inactive), S/p resection/RT, SBO presenting with LUQ pain. Patient had colonoscopy on Tuesday, endorsed onset of lower abdominal pain radiating to the back on Wednesday. Also endorses that pain is pleuritic, denies current fevers, nausea, vomiting, decreased p.o. intake.    Denies cough, dysuria. Found to have pericardial effusion on CT       Problem/Plan- 1:  Problem: Pericardial effusion   Plan: TTE results pending to assess effusion  Pending TTE will consider repeat TTE in 48 hrs to assess stability of effusion   Hgb stable, cont to trend     Problem/Plan- 2:  Problem: Subcapsular splenic hematoma.  Plan: Hgb stable   No acute intervention per surg  [place] following     Problem/Plan- 3:  Problem: HLD   Plan: c/w Lipitor 10mg   obtain lipid panel    Problem/Plan- 3:  Problem: Sarcoma  Plan: In remission        Differential diagnosis and plan of care discussed with patient after the evaluation. Counseling on diet, nutritional counseling, weight management, exercise and medication compliance was done.   Advanced care planning/advanced directives discussed with patient/family. DNR status including forceful chest compressions to attempt to restart the heart, ventilator support/artificial breathing, electric shock, artificial nutrition, health care proxy, Molst form all discussed with pt. Pt wishes to consider. More than fifteen minutes spent on discussing advanced directives.     PATRICIA Denny DO [facility]  Cardiovascular Medicine  [address] [place] [address], [address]  Office [phone]  Available via call or text on Microsoft Teams

## 2023-02-12 NOTE — PROGRESS NOTE ADULT - ASSESSMENT
Assessment:  57y Female with hx of right groin sarcoma s/p radiation and excision, SBO s/p ex lap, SBR, primary anastomosis. Here with 4 days of LUQ pain, after colonoscopy. Vitals and labs stable. Imaging shows subcapsular splenic hematoma.     Plan:  - no acute surgical intervention at this time  - reports pain to be mostly positional and well controlled   - no contraindications to discharge     - plan dw attending    Surgery  1324

## 2023-02-12 NOTE — ED ADULT NURSE REASSESSMENT NOTE - NS ED NURSE REASSESS COMMENT FT1
Spoke with NP Rafael Schmitt regarding pt's pain. NP in a code at this time, unable to put in order. Will continue to monitor.

## 2023-02-12 NOTE — DISCHARGE NOTE PROVIDER - NSDCMRMEDTOKEN_GEN_ALL_CORE_FT
Lipitor 10 mg oral tablet: 1 tab(s) orally once a day (at bedtime)  meloxicam 15 mg oral tablet: 1 tab(s) orally once a day  oxyCODONE 5 mg oral tablet: 1 tab(s) orally 3 times a day  SEROquel 25 mg oral tablet: 1 tab(s) orally once a day (at bedtime)  venlafaxine 37.5 mg oral capsule, extended release: 1 cap(s) orally once a day  venlafaxine 75 mg oral capsule, extended release: 1 cap(s) orally once a day

## 2023-02-12 NOTE — H&P ADULT - NSHPREVIEWOFSYSTEMS_GEN_ALL_CORE
REVIEW OF SYSTEMS:    CONSTITUTIONAL:+ fever   EYES/ENT: No visual changes;  No vertigo or throat pain   NECK: No pain or stiffness  RESPIRATORY: No cough, wheezing, hemoptysis; No shortness of breath  CARDIOVASCULAR: No chest pain or palpitations  GASTROINTESTINAL: + abdominal pain   GENITOURINARY: No dysuria, frequency or hematuria  NEUROLOGICAL: No numbness or weakness  SKIN: No itching, burning, rashes, or lesions   All other review of systems is negative unless indicated above.

## 2023-02-12 NOTE — DISCHARGE NOTE PROVIDER - NSDCCPCAREPLAN_GEN_ALL_CORE_FT
PRINCIPAL DISCHARGE DIAGNOSIS  Diagnosis: Pericardial effusion  Assessment and Plan of Treatment: Call your Health Care provider upon arrival home to make a follow up appointment within one week.  Take medications as prescribed by your Health Care Provider.  If your cough increases infrequency and severity and/or you have shortness of breath or increased shortness of breath call your Health Care Provider.  If you develop fever, chills, night sweats, malaise, and/or change in mental status call your Health care Provider.  Nutrition is very important.  Eat small frequent meals.  Increase your activity as tolerated.  Do not stay in bed all day        SECONDARY DISCHARGE DIAGNOSES  Diagnosis: Pericardial effusion  Assessment and Plan of Treatment:      PRINCIPAL DISCHARGE DIAGNOSIS  Diagnosis: Pericardial effusion  Assessment and Plan of Treatment: Cardiology and thoracic surgery consulted- no intervention per surgery  ASA held for 3 days- may resume per GI Dr. Smith tomorrow.   Echo shows small loculated pericardial effusion  Thoracic Sx Consult appreciated- no acute intervention at this time  Pt asymptomatic  Repeat TTE with stable/smaller effusion  Follow up with cardiology         SECONDARY DISCHARGE DIAGNOSES  Diagnosis: Splenic laceration  Assessment and Plan of Treatment: h/h stable. pain improving. appreciate surgery.   Cleared to resume aspirin per GI starting tomorrow  -tolerating regular diet  GI tried to call Dr. Felix again to update (yesterday there was no answer)  Follow up with GI outpatient    Diagnosis: HLD (hyperlipidemia)  Assessment and Plan of Treatment: Low fat diet, exercise daily and continue current medications. Follow up with primary care physician and cardiologist for management.    Diagnosis: Anxiety  Assessment and Plan of Treatment: Continue home medications    Diagnosis: Sarcoma  Assessment and Plan of Treatment: In remission   Continue surveillance CT yearly  Follow up with your PCP and hematology as needed

## 2023-02-12 NOTE — H&P ADULT - ASSESSMENT
Patient is a 58 Y/O Female with PMhx of HLD, Uterine fibroids, S/P resection, Sarcoma (inactive), S/p resection/RT, SBO presenting with LUQ pain. Patient had colonoscopy on Tuesday, endorsed onset of lower abdominal pain radiating to the back on Wednesday. Also endorses that pain is pleuritic, denies current fevers, nausea, vomiting, decreased p.o. intake.   Endorses fever x1 day.  Denies cough, dysuria. found ot have pericardial Effusion on CT       # Pericardial effusion     # subcapsular splenic hematoma.    # Sarcoma     # HLD     # dvt AN DGi ppx  Patient is a 56 Y/O Female with PMhx of HLD, Uterine fibroids, S/P resection, Sarcoma (inactive), S/p resection/RT, SBO presenting with LUQ pain. Patient had colonoscopy on Tuesday, endorsed onset of lower abdominal pain radiating to the back on Wednesday. Also endorses that pain is pleuritic, denies current fevers, nausea, vomiting, decreased p.o. intake.   Endorses fever x1 day.  Denies cough, dysuria. found ot have pericardial Effusion on CT       # Pericardial effusion   unknown etiology seen on CT  will plan for echo in 24 hrs   card eval called   serial CE adn EKG     # subcapsular splenic hematoma.  abdominal duiscomfort  surg eval appreciated  GI eval called   ? post colonoscopy trauma     # Sarcoma   on remission   completed course of treatment  survilance CT yearly       # HLD   statin   DASH diet     # ANxiety/ depresison   home meds    # dvt AN DGi ppx

## 2023-02-12 NOTE — DISCHARGE NOTE PROVIDER - CARE PROVIDER_API CALL
David Smith)  Gastroenterology; Internal Medicine  [address] [place] [address]  Phone: [phone]  Fax: [phone]  Follow Up Time:     Dwayne Naylor (DO)  Cardiovascular Disease; Internal Medicine; Nuclear Cardiology  [address], [address]  [place] [address]  Phone: [phone]  Fax: [phone]  Follow Up Time:

## 2023-02-12 NOTE — ED ADULT NURSE REASSESSMENT NOTE - NS ED NURSE REASSESS COMMENT FT1
Pt received from JOE Hodges. Pt oriented to CDU & plan of care was discussed. Pt A&O x 4. Pt is admitted and waiting for bed . Patient endorses 7/10 left sided rib cage pain. NP Sarah Schmitt made aware. Pt denies any chest pain, SOB, dizziness or palpitations. Patient on cardiac monitor. HR in 70. NSR.  V/S stable, pt afebrile,  IV in place, patent and free of signs of infiltration. Pt resting in bed. Safety & comfort measures maintained. Call bell in reach. Will continue to monitor.

## 2023-02-12 NOTE — PROGRESS NOTE ADULT - SUBJECTIVE AND OBJECTIVE BOX
SUBJECTIVE:   Seen and examined at bedside. no acute events overnight.     OBJECTIVE: T(C): 36.8 (02-12-23 @ 08:42), Max: 37.8 (02-11-23 @ 19:37)  HR: 84 (02-12-23 @ 08:42) (84 - 98)  BP: 95/61 (02-12-23 @ 08:42) (95/61 - 133/73)  RR: 18 (02-12-23 @ 08:42) (17 - 19)  SpO2: 96% (02-12-23 @ 08:42) (96% - 100%)  Wt(kg): --        PHYSICAL EXAM:  GENERAL: NAD, well-appearing  CHEST/LUNG: Clear to auscultation bilaterally  HEART: Regular rate and rhythm  ABDOMEN: Soft, minimal LUQ tenderness, well healed midline laparotomy scar  EXTREMITIES:  No clubbing, cyanosis, or edema    MEDICATIONS  (STANDING):  atorvastatin 10 milliGRAM(s) Oral at bedtime  celecoxib 200 milliGRAM(s) Oral once  oxyCODONE    IR 5 milliGRAM(s) Oral every 12 hours  venlafaxine XR 75 milliGRAM(s) Oral daily  venlafaxine XR 37.5 milliGRAM(s) Oral daily    MEDICATIONS  (PRN):      LABS:                        10.8   7.06  )-----------( 206      ( 12 Feb 2023 06:21 )             32.9     02-11    142  |  102  |  12  ----------------------------<  94  4.1   |  26  |  0.74    Ca    9.5      11 Feb 2023 19:07    TPro  6.9  /  Alb  4.1  /  TBili  0.4  /  DBili  x   /  AST  16  /  ALT  8<L>  /  AlkPhos  86  02-11    PT/INR - ( 11 Feb 2023 19:07 )   PT: 12.8 sec;   INR: 1.10 ratio         PTT - ( 11 Feb 2023 19:07 )  PTT:28.5 sec  Urinalysis Basic - ( 12 Feb 2023 00:35 )    Color: Light Yellow / Appearance: Clear / SG: >1.050 / pH: x  Gluc: x / Ketone: Small  / Bili: Negative / Urobili: Negative   Blood: x / Protein: 30 mg/dL / Nitrite: Negative   Leuk Esterase: Negative / RBC: 24 /hpf / WBC 14 /HPF   Sq Epi: x / Non Sq Epi: 0 /hpf / Bacteria: Negative

## 2023-02-12 NOTE — H&P ADULT - HISTORY OF PRESENT ILLNESS
Patient is a 58 Y/O Female with PMhx of HLD, Uterine fibroids, S/P resection, Sarcoma (inactive), S/p resection/RT, SBO p/w LUQ pain. Patient had colonoscopy on Tuesday, endorsed onset of lower abdominal pain radiating to the back on Wednesday. Also endorses that pain is pleuritic, denies current fevers, nausea, vomiting, decreased p.o. intake.   Endorses fever x1 day.  Denies cough, dysuria. found ot have pericardial Effusion on CT

## 2023-02-12 NOTE — DISCHARGE NOTE PROVIDER - NSDCFUADDAPPT_GEN_ALL_CORE_FT
APPTS ARE READY TO BE MADE: [ ] YES    Best Family or Patient Contact (if needed):    Additional Information about above appointments (if needed):    1: PCP Dr. Nikolas Rodriguez [phone] within 1 week of discharge  2: Cardiology within 1 week  3: GI within 1 week of discharge    Other comments or requests:

## 2023-02-13 LAB
A1C WITH ESTIMATED AVERAGE GLUCOSE RESULT: 5.1 % — SIGNIFICANT CHANGE UP (ref 4–5.6)
ALBUMIN SERPL ELPH-MCNC: 3.4 G/DL — SIGNIFICANT CHANGE UP (ref 3.3–5)
ALP SERPL-CCNC: 80 U/L — SIGNIFICANT CHANGE UP (ref 40–120)
ALT FLD-CCNC: 5 U/L — LOW (ref 10–45)
ANION GAP SERPL CALC-SCNC: 13 MMOL/L — SIGNIFICANT CHANGE UP (ref 5–17)
AST SERPL-CCNC: 14 U/L — SIGNIFICANT CHANGE UP (ref 10–40)
BASOPHILS # BLD AUTO: 0.02 K/UL — SIGNIFICANT CHANGE UP (ref 0–0.2)
BASOPHILS NFR BLD AUTO: 0.3 % — SIGNIFICANT CHANGE UP (ref 0–2)
BILIRUB SERPL-MCNC: 0.4 MG/DL — SIGNIFICANT CHANGE UP (ref 0.2–1.2)
BUN SERPL-MCNC: 13 MG/DL — SIGNIFICANT CHANGE UP (ref 7–23)
CALCIUM SERPL-MCNC: 9 MG/DL — SIGNIFICANT CHANGE UP (ref 8.4–10.5)
CHLORIDE SERPL-SCNC: 107 MMOL/L — SIGNIFICANT CHANGE UP (ref 96–108)
CHOLEST SERPL-MCNC: 141 MG/DL — SIGNIFICANT CHANGE UP
CO2 SERPL-SCNC: 25 MMOL/L — SIGNIFICANT CHANGE UP (ref 22–31)
CREAT SERPL-MCNC: 0.67 MG/DL — SIGNIFICANT CHANGE UP (ref 0.5–1.3)
CULTURE RESULTS: SIGNIFICANT CHANGE UP
EGFR: 101 ML/MIN/1.73M2 — SIGNIFICANT CHANGE UP
EOSINOPHIL # BLD AUTO: 0.19 K/UL — SIGNIFICANT CHANGE UP (ref 0–0.5)
EOSINOPHIL NFR BLD AUTO: 2.4 % — SIGNIFICANT CHANGE UP (ref 0–6)
ESTIMATED AVERAGE GLUCOSE: 100 MG/DL — SIGNIFICANT CHANGE UP (ref 68–114)
GLUCOSE SERPL-MCNC: 89 MG/DL — SIGNIFICANT CHANGE UP (ref 70–99)
HCT VFR BLD CALC: 32.4 % — LOW (ref 34.5–45)
HCV AB S/CO SERPL IA: 0.07 S/CO — SIGNIFICANT CHANGE UP (ref 0–0.99)
HCV AB SERPL-IMP: SIGNIFICANT CHANGE UP
HDLC SERPL-MCNC: 62 MG/DL — SIGNIFICANT CHANGE UP
HGB BLD-MCNC: 10.7 G/DL — LOW (ref 11.5–15.5)
IMM GRANULOCYTES NFR BLD AUTO: 0.4 % — SIGNIFICANT CHANGE UP (ref 0–0.9)
LIPID PNL WITH DIRECT LDL SERPL: 56 MG/DL — SIGNIFICANT CHANGE UP
LYMPHOCYTES # BLD AUTO: 0.91 K/UL — LOW (ref 1–3.3)
LYMPHOCYTES # BLD AUTO: 11.5 % — LOW (ref 13–44)
MCHC RBC-ENTMCNC: 29 PG — SIGNIFICANT CHANGE UP (ref 27–34)
MCHC RBC-ENTMCNC: 33 GM/DL — SIGNIFICANT CHANGE UP (ref 32–36)
MCV RBC AUTO: 87.8 FL — SIGNIFICANT CHANGE UP (ref 80–100)
MONOCYTES # BLD AUTO: 0.86 K/UL — SIGNIFICANT CHANGE UP (ref 0–0.9)
MONOCYTES NFR BLD AUTO: 10.9 % — SIGNIFICANT CHANGE UP (ref 2–14)
NEUTROPHILS # BLD AUTO: 5.88 K/UL — SIGNIFICANT CHANGE UP (ref 1.8–7.4)
NEUTROPHILS NFR BLD AUTO: 74.5 % — SIGNIFICANT CHANGE UP (ref 43–77)
NON HDL CHOLESTEROL: 79 MG/DL — SIGNIFICANT CHANGE UP
NRBC # BLD: 0 /100 WBCS — SIGNIFICANT CHANGE UP (ref 0–0)
PLATELET # BLD AUTO: 229 K/UL — SIGNIFICANT CHANGE UP (ref 150–400)
POTASSIUM SERPL-MCNC: 4.1 MMOL/L — SIGNIFICANT CHANGE UP (ref 3.5–5.3)
POTASSIUM SERPL-SCNC: 4.1 MMOL/L — SIGNIFICANT CHANGE UP (ref 3.5–5.3)
PROT SERPL-MCNC: 6.5 G/DL — SIGNIFICANT CHANGE UP (ref 6–8.3)
RBC # BLD: 3.69 M/UL — LOW (ref 3.8–5.2)
RBC # FLD: 12.2 % — SIGNIFICANT CHANGE UP (ref 10.3–14.5)
SODIUM SERPL-SCNC: 145 MMOL/L — SIGNIFICANT CHANGE UP (ref 135–145)
SPECIMEN SOURCE: SIGNIFICANT CHANGE UP
TRIGL SERPL-MCNC: 116 MG/DL — SIGNIFICANT CHANGE UP
TSH SERPL-MCNC: 2.07 UIU/ML — SIGNIFICANT CHANGE UP (ref 0.27–4.2)
WBC # BLD: 7.89 K/UL — SIGNIFICANT CHANGE UP (ref 3.8–10.5)
WBC # FLD AUTO: 7.89 K/UL — SIGNIFICANT CHANGE UP (ref 3.8–10.5)

## 2023-02-13 PROCEDURE — 99222 1ST HOSP IP/OBS MODERATE 55: CPT

## 2023-02-13 RX ORDER — QUETIAPINE FUMARATE 200 MG/1
25 TABLET, FILM COATED ORAL ONCE
Refills: 0 | Status: COMPLETED | OUTPATIENT
Start: 2023-02-13 | End: 2023-02-13

## 2023-02-13 RX ORDER — ASPIRIN/CALCIUM CARB/MAGNESIUM 324 MG
81 TABLET ORAL DAILY
Refills: 0 | Status: DISCONTINUED | OUTPATIENT
Start: 2023-02-13 | End: 2023-02-13

## 2023-02-13 RX ADMIN — OXYCODONE HYDROCHLORIDE 5 MILLIGRAM(S): 5 TABLET ORAL at 17:32

## 2023-02-13 RX ADMIN — Medication 75 MILLIGRAM(S): at 12:56

## 2023-02-13 RX ADMIN — Medication 37.5 MILLIGRAM(S): at 12:56

## 2023-02-13 RX ADMIN — OXYCODONE HYDROCHLORIDE 5 MILLIGRAM(S): 5 TABLET ORAL at 06:31

## 2023-02-13 RX ADMIN — ATORVASTATIN CALCIUM 10 MILLIGRAM(S): 80 TABLET, FILM COATED ORAL at 21:24

## 2023-02-13 RX ADMIN — QUETIAPINE FUMARATE 25 MILLIGRAM(S): 200 TABLET, FILM COATED ORAL at 23:07

## 2023-02-13 NOTE — ED ADULT NURSE REASSESSMENT NOTE - NS ED NURSE REASSESS COMMENT FT1
spoke with Cherelle NP for off tele order report to Kierra in PICU pt transport via stretcher alert and oriented no distress

## 2023-02-13 NOTE — CONSULT NOTE ADULT - ATTENDING COMMENTS
patient with chronic small pericardial effusion and no clinical signs of tamponade . patient has had similar finding on ct scan dating back to 2016.   would defer drainage of effusion given chronicity, clinical status and no increasing size.   discussed with patient who understood and agreeable.   reconsult prn.

## 2023-02-13 NOTE — CONSULT NOTE ADULT - SUBJECTIVE AND OBJECTIVE BOX
Chief Complaint:  Patient is a 57y old  Female who presents with a chief complaint of  LUQ  pain   (2023 21:24)      Date of service: 23 @ 10:36    HPI:    The patient  is a 56 Y/O Female with PMhx of HLD, Uterine fibroids, S/P resection, Sarcoma (inactive), S/p resection/RT, SBO p/w LUQ pain. Patient had colonoscopy on Tuesday, endorsed onset of lower abdominal pain radiating to the back on Wednesday. Also endorses that pain is pleuritic, denies current fevers, nausea, vomiting, decreased p.o. intake.    The patient denies dysphagia, nausea and vomiting, diarrhea, unintentional weight loss, change in bowel habits or NSAID use.      Allergies:  Invanz (Rash)  vancomycin (Rash)      Home Medications:    Hospital Medications:  acetaminophen   IVPB .. 1000 milliGRAM(s) IV Intermittent once  atorvastatin 10 milliGRAM(s) Oral at bedtime  oxyCODONE    IR 5 milliGRAM(s) Oral every 12 hours  venlafaxine XR 75 milliGRAM(s) Oral daily  venlafaxine XR 37.5 milliGRAM(s) Oral daily      PMHX/PSHX:  Sarcoma    Smoker    Depression    Hyperlipidemia    Open wound anterior abdominal wall    Fibroid, uterine    Post partum depression    Small bowel obstruction due to adhesions     novel coronavirus disease (COVID-19)    COVID-19 vaccine series completed    S/P appendectomy    S/P lumpectomy, right breast    S/P tonsillectomy    S/P myomectomy    S/P ovarian cystectomy    S/P oophorectomy    S/P  section    Sarcoma        Family history:  Family history of brain cancer (Sibling)    Family history of thyroid cancer (Sibling)        Social History:   Denies ethanol use.  Denies illicit drug use.    ROS:     General:  No wt loss, fevers, chills, night sweats, fatigue,   Eyes:  Good vision, no reported pain  ENT:  No sore throat, pain, runny nose, dysphagia  CV:  No pain, palpitations, hypo/hypertension  Resp:  No dyspnea, cough, tachypnea, wheezing  GI:  See HPI  :  No pain, bleeding, incontinence, nocturia  Muscle:  No pain, weakness  Neuro:  No weakness, tingling, memory problems  Psych:  No fatigue, insomnia, mood problems, depression  Endocrine:  No polyuria, polydipsia, cold/heat intolerance  Heme:  No petechiae, ecchymosis, easy bruisability  Integumentary:  No rash, edema      PHYSICAL EXAM:     GENERAL:  Appears stated age, well-groomed, well-nourished, no distress  HEENT:  NC/AT,  conjunctivae anicteric, clear and pink,   NECK: supple, trachea midline  CHEST:  Full & symmetric excursion, no increased effort, breath sounds clear  HEART:  Regular rhythm, no JVD  ABDOMEN:  Soft, LUQ-tender, non-distended, normoactive bowel sounds,  no masses , no hepatosplenomegaly  EXTREMITIES:  no cyanosis,clubbing or edema  SKIN:  No rash, erythema, or, ecchymoses, no jaundice  NEURO:  Alert, non-focal, no asterixis  PSYCH: Appropriate affect, oriented to place and time  RECTAL: Deferred      Vital Signs:  Vital Signs Last 24 Hrs  T(C): 36.8 (2023 07:26), Max: 37 (2023 04:15)  T(F): 98.2 (2023 07:26), Max: 98.6 (2023 04:15)  HR: 73 (2023 07:26) (73 - 93)  BP: 103/68 (2023 07:26) (102/65 - 124/74)  BP(mean): 82 (2023 15:55) (82 - 82)  RR: 18 (2023 07:26) (17 - 18)  SpO2: 97% (2023 07:26) (96% - 98%)    Parameters below as of 2023 07:26  Patient On (Oxygen Delivery Method): room air      Daily     Daily     LABS: Labs personally reviewed by me:                        10.7   7.89  )-----------( 229      ( 2023 05:57 )             32.4     02-13    145  |  107  |  13  ----------------------------<  89  4.1   |  25  |  0.67    Ca    9.0      2023 05:57    TPro  6.5  /  Alb  3.4  /  TBili  0.4  /  DBili  x   /  AST  14  /  ALT  5<L>  /  AlkPhos  80  02-13    LIVER FUNCTIONS - ( 2023 05:57 )  Alb: 3.4 g/dL / Pro: 6.5 g/dL / ALK PHOS: 80 U/L / ALT: 5 U/L / AST: 14 U/L / GGT: x           PT/INR - ( 2023 19:07 )   PT: 12.8 sec;   INR: 1.10 ratio         PTT - ( 2023 19:07 )  PTT:28.5 sec  Urinalysis Basic - ( 2023 00:35 )    Color: Light Yellow / Appearance: Clear / SG: >1.050 / pH: x  Gluc: x / Ketone: Small  / Bili: Negative / Urobili: Negative   Blood: x / Protein: 30 mg/dL / Nitrite: Negative   Leuk Esterase: Negative / RBC: 24 /hpf / WBC 14 /HPF   Sq Epi: x / Non Sq Epi: 0 /hpf / Bacteria: Negative          Imaging personally reviewed by me:

## 2023-02-13 NOTE — CONSULT NOTE ADULT - SUBJECTIVE AND OBJECTIVE BOX
THORACIC SURGERY CONSULT NOTE    HPI:  57F with PMhx of HLD, Uterine fibroids, right groin sarcoma, S/p resection/RT, SBO p/w LUQ pain. Patient presented to the ED after having colonoscopy on Tuesday of this past week and developing onset of lower abdominal pain radiating to the back on Wednesday. While in the ED, she underwent a CT scan and was found to have Also endorses that pain is pleuritic, denies current fevers, nausea, vomiting, decreased p.o. intake.    The patient denies dysphagia, nausea and vomiting, diarrhea, unintentional weight loss, change in bowel habits or NSAID use.        PMHx: Sarcoma    Smoker    Depression    Hyperlipidemia    Open wound anterior abdominal wall    Fibroid, uterine    Post partum depression    Small bowel obstruction due to adhesions     novel coronavirus disease (COVID-19)    COVID-19 vaccine series completed      PSHx: S/P appendectomy    S/P lumpectomy, right breast    S/P tonsillectomy    S/P myomectomy    S/P ovarian cystectomy    S/P oophorectomy    S/P  section    Sarcoma    Medications (inpatient): acetaminophen   IVPB .. 1000 milliGRAM(s) IV Intermittent once  atorvastatin 10 milliGRAM(s) Oral at bedtime  oxyCODONE    IR 5 milliGRAM(s) Oral every 12 hours  venlafaxine XR 75 milliGRAM(s) Oral daily  venlafaxine XR 37.5 milliGRAM(s) Oral daily      Allergies: Invanz (Rash)  Social Hx: Lives at home with her  and two daughters, works at [employer] in the [employer] office, smokes 1 pack per day for several years,   Family Hx: Family history of brain cancer     Family history of thyroid cancer (Sibling)  endometrial ca        Physical Exam  T(C): 36.8  HR: 73 (73 - 93)  BP: 103/68 (102/65 - 124/74)  RR: 18 (17 - 18)  SpO2: 97% (96% - 98%)  Tmax: T(C): , Max: 37 (-23 @ 04:15)    General: well developed, well nourished, NAD  Neuro: alert and oriented, no focal deficits, moves all extremities spontaneously  HEENT: NCAT, EOMI, anicteric, mucosa moist  Respiratory: respirations unlabored on RA  CVS: regular rate and rhythm, S1, S2, but muffled heart sounds  Abdomen: soft, nontender, nondistended, small area of ecchymosis at umbilicus  Groin: Right groin with well healed surgical resection and reconstruction   Extremities: no edema, sensation and movement grossly intact  Skin: warm, dry, appropriate color    Labs:                        10.7   7.89  )-----------( 229      ( 2023 05:57 )             32.4     PT/INR - ( 2023 19:07 )   PT: 12.8 sec;   INR: 1.10 ratio         PTT - ( 2023 19:07 )  PTT:28.5 sec      145  |  107  |  13  ----------------------------<  89  4.1   |  25  |  0.67    Ca    9.0      2023 05:57    TPro  6.5  /  Alb  3.4  /  TBili  0.4  /  DBili  x   /  AST  14  /  ALT  5<L>  /  AlkPhos  80      Urinalysis Basic - ( 2023 00:35 )    Color: Light Yellow / Appearance: Clear / SG: >1.050 / pH: x  Gluc: x / Ketone: Small  / Bili: Negative / Urobili: Negative   Blood: x / Protein: 30 mg/dL / Nitrite: Negative   Leuk Esterase: Negative / RBC: 24 /hpf / WBC 14 /HPF   Sq Epi: x / Non Sq Epi: 0 /hpf / Bacteria: Negative      Imaging and other studies:  c< from: CT Angio Chest PE Protocol w/ IV Cont (23 @ 20:24) >  INTERPRETATION:  CLINICAL INFORMATION: Left pleuritic pain. Worsening   abdominal pain. Rule out pulmonary embolism.    COMPARISON: Chest CT dated 5/3/2022. CT abdomen/pelvis dated 10/24/2017.    CONTRAST/COMPLICATIONS:  IV Contrast: IV contrast documented in unlinked concurrent exam   (accession [id number]), Omnipaque 300 (accession [id number])  90 cc   administered   10 cc discarded  Oral Contrast: NONE  Complications: None reported at time of study completion    PROCEDURE:  CT Angiography of the Chest was performed followed by portal venous phase   imaging of the Abdomen and Pelvis.  Sagittal and coronal reformats were performed as well as 3D (MIP)   reconstructions.    FINDINGS:  CHEST:  LUNGS AND LARGE AIRWAYS: Patent central airways. Right middle lobe   peripheral nodules measuring upto 6 mm unchanged compared to prior exam   from 5/3/2022. No lung consolidation or mass. Mild peripheral   reticulation. Mild basilar dependent and platelike atelectasis..  PLEURA: No pleural effusion.  VESSELS: The contrast bolus is satisfactory. There is no pulmonary   embolism. Main pulmonary artery is not dilated. Four-vessel aortic arch.   Mild atherosclerotic calcifications of the thoracic aorta and coronary   arteries.  HEART: Moderate sized pericardial effusion measuring up to 1.4 cm in   thickness over the left lateral ventricle and 1.6 cm in thickness over   the right lateral ventricle.  MEDIASTINUM AND KIAH: No lymphadenopathy.  CHEST WALL AND LOWER NECK: Within normal limits.    ABDOMEN AND PELVIS:  LIVER: Within normal limits.  BILE DUCTS: Normal caliber.  GALLBLADDER: Normally distended. Cholelithiasis. No pericholecystic   inflammatory changes.  SPLEEN: Heterogeneous subcapsular splenic hematoma measuring up to 3.3 cm   in maximum thickness with small volume of perisplenic hematoma.  PANCREAS: Within normal limits.  ADRENALS: Within normal limits.  KIDNEYS/URETERS: Kidneys enhance symmetrically without hydronephrosis. 7   mm left interpolar region angiomyolipoma.    BLADDER: Within normal limits.  REPRODUCTIVE ORGANS: Uterus and adnexa are unremarkable.    BOWEL: Small hiatus hernia. No bowel obstruction. Post appendectomy.   Distal small bowel anastomosis. No bowel obstruction.  PERITONEUM: Small volume of hemoperitoneum in the pelvis. No   pneumoperitoneum. No mesenteric lymphadenopathy.  VESSELS: Within normal limits.  RETROPERITONEUM/LYMPH NODES: No lymphadenopathy.  ABDOMINAL WALL: Postsurgical changes with small wide neck ventral   abdominal wall hernia containing fat and a short segment of colon..   Calcifications and soft tissue fatty stranding about the right hip soft   tissues compatible with patients history of sarcoma status post   resection, better evaluated on hip MRI dated 5/3/2022.  BONES: Degenerative changes.    IMPRESSION:  No pulmonary embolism.    No acute parenchymal or pleural lung disease.    Moderate sized pericardial effusion which has increased in size compared   with prior exam from 5/3/2022, measuring up to 1.4 cm in thickness over   the left lateral ventricle and 1.6 cm in thickness over the right lateral   ventricle.    Splenic subcapsular hematoma measuring up to 3.3 cm in maximum thickness   with small volume of perisplenic hematoma and hemoperitoneum in the   pelvis.    Cholelithiasis.    < end of copied text >  < from: Transthoracic Echocardiogram (23 @ 07:19) >  PROCEDURE: Transthoracic echocardiogram with 2-D, M-Mode  and complete spectral and color flow Doppler. Strain  Imaging.  INDICATION: Other pericardial effusion (noninflammatory)  (I31.39)  ------------------------------------------------------------------------  Dimensions:    Normal Values:  LA:     3.5    2.0 - 4.0 cm  Ao:     3.3    2.0 - 3.8 cm  SEPTUM: 0.8    0.6 - 1.2 cm  PWT:    1.0    0.6 - 1.1 cm  LVIDd:  4.0    3.0 - 5.6 cm  LVIDs:  2.6    1.8 - 4.0 cm  Derived variables:  LVMI: 68 g/m2  RWT: 0.50  Fractional short: 35 %  EF (Modified Del Rosario Rule): 68 %Doppler Peak Velocity  (m/sec): AoV=1.4  ------------------------------------------------------------------------  Observations:  Mitral Valve: Normal mitral valve. Minimal mitral  regurgitation.  Aortic Valve/Aorta: Normal trileaflet aortic valve. Peak  transaortic valve gradient equals 8 mm Hg. No aortic valve  regurgitation seen. Peak left ventricular outflow tract  gradient equals 6 mm Hg, mean gradient is equal to 2 mm Hg,  LVOT velocity time integral equals 21 cm.  Aortic Root: 3.3 cm. The descending thoracic aorta is 2.6  cm with a large piece of calcific plaque (1.4 x 1.1 cm).  Left Atrium: Normal left atrium.  LA volume index = 29  cc/m2.  Left Ventricle: Normal left ventricular systolic function.  No segmental wall motion abnormalities. LVEF calculated  using biplane Del Rosario's method is 68%. Normal left  ventricular internal dimensions and wall thicknesses.  Normal diastolic function.  Right Heart: Normal right atrium. Normal right ventricular  size and function. Normal tricuspid valve. No tricuspid  regurgitation. Normal pulmonic valve.  Pericardium/Pleura: Small loculated pericardial effusion,  seen posterior to the left ventricle (0.9 cm), anterior to  the RVOT (1.2 cm) and a trivial amount adjacent to the  right atrium.  Hemodynamic: IVC is normal. Unable to estimate RVSP.  ------------------------------------------------------------------------  Conclusions:  1. Normal mitral valve. Minimal mitral regurgitation.  2. Aortic Root: 3.3 cm. The descending thoracic aorta is  2.6 cm with a large piece of calcific plaque (1.4 x 1.1  cm).  3. Normal left atrium.  LA volume index = 29 cc/m2.Normal  left ventricular internal dimensions and wall  thicknesses.Normal left ventricular systolic function. No  segmental wall motion abnormalities. LVEF calculatedusing  biplane Del Rosario's method is 68%. Strain imaging was  performed with a HR of 77 bpm and a BP of 133/77. Global L.  Strain= -28%. Normal diastolic function.  4. Normal right atrium.Normal right ventricular size and  function.  5. Normal tricuspid valve. No tricuspid  regurgitation.Unable to estimate RVSP.  6. Small loculated pericardial effusion, seen posterior to  the left ventricle (0.9 cm), anterior to the RVOT (1.2 cm)  and a trivial amount adjacent to the right atrium.  *** No previous Echo exam.  -----------------------------------    < end of copied text >     THORACIC SURGERY CONSULT NOTE    HPI:  57F with PMhx of HLD, Uterine fibroids, superficial subcutaneous soft tissue sarcoma of right groin s/p resection/RT in  and recurrence requiring additional resection and plastic reconstruction in , SBO s/p ex lap ASHELY  (all abdominal surgeries by Dr. LAW Lynch). She presented to the ED after having colonoscopy on Tuesday of this past week and developing onset of lower abdominal pain radiating to the back on Wednesday. While in the ED, she underwent a CT scan and was found to have a moderate loculated pericardial effusion (1.4cm over left lateral ventricle and 1.6cm over right lateral ventricle) and splenic subcapsular hematoma. She then underwent a TTE in the ED which showed a small pericardial effusion  posterior to the left ventricle (0.9 cm), anterior to the RVOT (1.2 cm), normal EF 68% without tamponade. Of note, she has had serial CT scans of the chest since  looking for recurrent sarcoma and has had evidence of small pericardial effusion as early as a scan on 2015. She denies ever being told about a perciardial effusion. She is a smoker - 1 pack per day for several years. She is chronically on NSAIDs (maloxicam) since  for "inflammatory edema" of her right thigh. Since presentation, she denies any chest pain, shortness of breath, palpitations lightheadedness, dizziness. She had a fever of 101F at home on friday but has not had any fever, chills, nausea or vomiting since then.     In the ED, she is afebrile, nontachycardic in normal sinus rhythm, normotensive, breathing comfortable on room air.     PMHx: Sarcoma    Smoker    Depression    Hyperlipidemia    Open wound anterior abdominal wall    Fibroid, uterine    Post partum depression    Small bowel obstruction due to adhesions    2019 novel coronavirus disease (COVID-19)    COVID-19 vaccine series completed      PSHx: S/P appendectomy    S/P lumpectomy, right breast    S/P tonsillectomy    S/P myomectomy    S/P ovarian cystectomy    S/P oophorectomy    S/P  section    Sarcoma    Medications (inpatient): acetaminophen   IVPB .. 1000 milliGRAM(s) IV Intermittent once  atorvastatin 10 milliGRAM(s) Oral at bedtime  oxyCODONE    IR 5 milliGRAM(s) Oral every 12 hours  venlafaxine XR 75 milliGRAM(s) Oral daily  venlafaxine XR 37.5 milliGRAM(s) Oral daily      Allergies: Invanz (Rash)  Social Hx: Lives at home with her  and two daughters, works at "[employer] in the [employer] office, smokes 1 pack per day for several years,   Family Hx: Family history of brain cancer     Family history of thyroid cancer (Sibling)  endometrial ca    Physical Exam  T(C): 36.8  HR: 73 (73 - 93)  BP: 103/68 (102/65 - 124/74)  RR: 18 (17 - 18)  SpO2: 97% (96% - 98%)  Tmax: T(C): , Max: 37 (23 @ 04:15)    General: well developed, well nourished, NAD  Neuro: alert and oriented, no focal deficits, moves all extremities spontaneously  HEENT: NCAT, EOMI, anicteric, mucosa moist  Respiratory: respirations unlabored on RA  CVS: regular rate and rhythm, S1, S2, but muffled heart sounds, no pericardial rub appreciated   Abdomen: soft, nontender, nondistended, small area of ecchymosis at umbilicus  Groin: Right groin with well healed surgical resection and reconstruction   Extremities: no edema, sensation and movement grossly intact  Skin: warm, dry, appropriate color    Labs:                        10.7   7.89  )-----------( 229      ( 2023 05:57 )             32.4     PT/INR - ( 2023 19:07 )   PT: 12.8 sec;   INR: 1.10 ratio         PTT - ( 2023 19:07 )  PTT:28.5 sec      145  |  107  |  13  ----------------------------<  89  4.1   |  25  |  0.67    Ca    9.0      2023 05:57    TPro  6.5  /  Alb  3.4  /  TBili  0.4  /  DBili  x   /  AST  14  /  ALT  5<L>  /  AlkPhos  80      Urinalysis Basic - ( 2023 00:35 )    Color: Light Yellow / Appearance: Clear / SG: >1.050 / pH: x  Gluc: x / Ketone: Small  / Bili: Negative / Urobili: Negative   Blood: x / Protein: 30 mg/dL / Nitrite: Negative   Leuk Esterase: Negative / RBC: 24 /hpf / WBC 14 /HPF   Sq Epi: x / Non Sq Epi: 0 /hpf / Bacteria: Negative      Imaging and other studies:  c< from: CT Angio Chest PE Protocol w/ IV Cont (23 @ 20:24) >  INTERPRETATION:  CLINICAL INFORMATION: Left pleuritic pain. Worsening   abdominal pain. Rule out pulmonary embolism.    COMPARISON: Chest CT dated 5/3/2022. CT abdomen/pelvis dated 10/24/2017.    CONTRAST/COMPLICATIONS:  IV Contrast: IV contrast documented in unlinked concurrent exam   (accession [id number]), Omnipaque 300 (accession [id number])  90 cc   administered   10 cc discarded  Oral Contrast: NONE  Complications: None reported at time of study completion    PROCEDURE:  CT Angiography of the Chest was performed followed by portal venous phase   imaging of the Abdomen and Pelvis.  Sagittal and coronal reformats were performed as well as 3D (MIP)   reconstructions.    FINDINGS:  CHEST:  LUNGS AND LARGE AIRWAYS: Patent central airways. Right middle lobe   peripheral nodules measuring upto 6 mm unchanged compared to prior exam   from 5/3/2022. No lung consolidation or mass. Mild peripheral   reticulation. Mild basilar dependent and platelike atelectasis..  PLEURA: No pleural effusion.  VESSELS: The contrast bolus is satisfactory. There is no pulmonary   embolism. Main pulmonary artery is not dilated. Four-vessel aortic arch.   Mild atherosclerotic calcifications of the thoracic aorta and coronary   arteries.  HEART: Moderate sized pericardial effusion measuring up to 1.4 cm in   thickness over the left lateral ventricle and 1.6 cm in thickness over   the right lateral ventricle.  MEDIASTINUM AND KIAH: No lymphadenopathy.  CHEST WALL AND LOWER NECK: Within normal limits.    ABDOMEN AND PELVIS:  LIVER: Within normal limits.  BILE DUCTS: Normal caliber.  GALLBLADDER: Normally distended. Cholelithiasis. No pericholecystic   inflammatory changes.  SPLEEN: Heterogeneous subcapsular splenic hematoma measuring up to 3.3 cm   in maximum thickness with small volume of perisplenic hematoma.  PANCREAS: Within normal limits.  ADRENALS: Within normal limits.  KIDNEYS/URETERS: Kidneys enhance symmetrically without hydronephrosis. 7   mm left interpolar region angiomyolipoma.    BLADDER: Within normal limits.  REPRODUCTIVE ORGANS: Uterus and adnexa are unremarkable.    BOWEL: Small hiatus hernia. No bowel obstruction. Post appendectomy.   Distal small bowel anastomosis. No bowel obstruction.  PERITONEUM: Small volume of hemoperitoneum in the pelvis. No   pneumoperitoneum. No mesenteric lymphadenopathy.  VESSELS: Within normal limits.  RETROPERITONEUM/LYMPH NODES: No lymphadenopathy.  ABDOMINAL WALL: Postsurgical changes with small wide neck ventral   abdominal wall hernia containing fat and a short segment of colon..   Calcifications and soft tissue fatty stranding about the right hip soft   tissues compatible with patients history of sarcoma status post   resection, better evaluated on hip MRI dated 5/3/2022.  BONES: Degenerative changes.    IMPRESSION:  No pulmonary embolism.    No acute parenchymal or pleural lung disease.    Moderate sized pericardial effusion which has increased in size compared   with prior exam from 5/3/2022, measuring up to 1.4 cm in thickness over   the left lateral ventricle and 1.6 cm in thickness over the right lateral   ventricle.    Splenic subcapsular hematoma measuring up to 3.3 cm in maximum thickness   with small volume of perisplenic hematoma and hemoperitoneum in the   pelvis.    Cholelithiasis.    < end of copied text >  < from: Transthoracic Echocardiogram (23 @ 07:19) >  PROCEDURE: Transthoracic echocardiogram with 2-D, M-Mode  and complete spectral and color flow Doppler. Strain  Imaging.  INDICATION: Other pericardial effusion (noninflammatory)  (I31.39)  ------------------------------------------------------------------------  Dimensions:    Normal Values:  LA:     3.5    2.0 - 4.0 cm  Ao:     3.3    2.0 - 3.8 cm  SEPTUM: 0.8    0.6 - 1.2 cm  PWT:    1.0    0.6 - 1.1 cm  LVIDd:  4.0    3.0 - 5.6 cm  LVIDs:  2.6    1.8 - 4.0 cm  Derived variables:  LVMI: 68 g/m2  RWT: 0.50  Fractional short: 35 %  EF (Modified Del Rosario Rule): 68 %Doppler Peak Velocity  (m/sec): AoV=1.4  ------------------------------------------------------------------------  Observations:  Mitral Valve: Normal mitral valve. Minimal mitral  regurgitation.  Aortic Valve/Aorta: Normal trileaflet aortic valve. Peak  transaortic valve gradient equals 8 mm Hg. No aortic valve  regurgitation seen. Peak left ventricular outflow tract  gradient equals 6 mm Hg, mean gradient is equal to 2 mm Hg,  LVOT velocity time integral equals 21 cm.  Aortic Root: 3.3 cm. The descending thoracic aorta is 2.6  cm with a large piece of calcific plaque (1.4 x 1.1 cm).  Left Atrium: Normal left atrium.  LA volume index = 29  cc/m2.  Left Ventricle: Normal left ventricular systolic function.  No segmental wall motion abnormalities. LVEF calculated  using biplane Del Rosario's method is 68%. Normal left  ventricular internal dimensions and wall thicknesses.  Normal diastolic function.  Right Heart: Normal right atrium. Normal right ventricular  size and function. Normal tricuspid valve. No tricuspid  regurgitation. Normal pulmonic valve.  Pericardium/Pleura: Small loculated pericardial effusion,  seen posterior to the left ventricle (0.9 cm), anterior to  the RVOT (1.2 cm) and a trivial amount adjacent to the  right atrium.  Hemodynamic: IVC is normal. Unable to estimate RVSP.  ------------------------------------------------------------------------  Conclusions:  1. Normal mitral valve. Minimal mitral regurgitation.  2. Aortic Root: 3.3 cm. The descending thoracic aorta is  2.6 cm with a large piece of calcific plaque (1.4 x 1.1  cm).  3. Normal left atrium.  LA volume index = 29 cc/m2.Normal  left ventricular internal dimensions and wall  thicknesses.Normal left ventricular systolic function. No  segmental wall motion abnormalities. LVEF calculatedusing  biplane Del Rosario's method is 68%. Strain imaging was  performed with a HR of 77 bpm and a BP of 133/77. Global L.  Strain= -28%. Normal diastolic function.  4. Normal right atrium.Normal right ventricular size and  function.  5. Normal tricuspid valve. No tricuspid  regurgitation.Unable to estimate RVSP.  6. Small loculated pericardial effusion, seen posterior to  the left ventricle (0.9 cm), anterior to the RVOT (1.2 cm)  and a trivial amount adjacent to the right atrium.  *** No previous Echo exam.  -----------------------------------    < end of copied text >

## 2023-02-13 NOTE — PROGRESS NOTE ADULT - SUBJECTIVE AND OBJECTIVE BOX
DATE OF SERVICE: 02-13-23 @ 10:09    Patient is a 57y old  Female who presents with a chief complaint of  LUQ  pain   (12 Feb 2023 21:24)      INTERVAL HISTORY: Feels ok.     REVIEW OF SYSTEMS:  CONSTITUTIONAL: No weakness  EYES/ENT: No visual changes;  No throat pain   NECK: No pain or stiffness  RESPIRATORY: No cough, wheezing; No shortness of breath  CARDIOVASCULAR: No chest pain or palpitations  GASTROINTESTINAL: No abdominal  pain. No nausea, vomiting, or hematemesis  GENITOURINARY: No dysuria, frequency or hematuria  NEUROLOGICAL: No stroke like symptoms  SKIN: No rashes    	  MEDICATIONS:        PHYSICAL EXAM:  T(C): 36.8 (02-13-23 @ 07:26), Max: 37 (02-13-23 @ 04:15)  HR: 73 (02-13-23 @ 07:26) (73 - 93)  BP: 103/68 (02-13-23 @ 07:26) (102/65 - 124/74)  RR: 18 (02-13-23 @ 07:26) (17 - 18)  SpO2: 97% (02-13-23 @ 07:26) (96% - 98%)  Wt(kg): --  I&O's Summary        Appearance: In no distress	  HEENT:    PERRL, EOMI	  Cardiovascular:  S1 S2, No JVD  Respiratory: Lungs clear to auscultation	  Gastrointestinal:  Soft, Non-tender, + BS	  Vascularature:  No edema of LE  Psychiatric: Appropriate affect   Neuro: no acute focal deficits                               10.7   7.89  )-----------( 229      ( 13 Feb 2023 05:57 )             32.4     02-13    145  |  107  |  13  ----------------------------<  89  4.1   |  25  |  0.67    Ca    9.0      13 Feb 2023 05:57    TPro  6.5  /  Alb  3.4  /  TBili  0.4  /  DBili  x   /  AST  14  /  ALT  5<L>  /  AlkPhos  80  02-13        Labs personally reviewed  < from: Transthoracic Echocardiogram (02.12.23 @ 07:19) >  1. Normal mitral valve. Minimal mitral regurgitation.  2. Aortic Root: 3.3 cm. The descending thoracic aorta is  2.6 cm with a large piece of calcific plaque (1.4 x 1.1  cm).  3. Normal left atrium.  LA volume index = 29 cc/m2.Normal  left ventricular internal dimensions and wall  thicknesses.Normal left ventricular systolic function. No  segmental wall motion abnormalities. LVEF calculatedusing  biplane Del Rosario's method is 68%. Strain imaging was  performed with a HR of 77 bpm and a BP of 133/77. Global L.  Strain= -28%. Normal diastolic function.  4. Normal right atrium.Normal right ventricular size and  function.  5. Normal tricuspid valve. No tricuspid  regurgitation.Unable to estimate RVSP.  6. Small loculated pericardial effusion, seen posterior to  the left ventricle (0.9 cm), anterior to the RVOT (1.2 cm)  and a trivial amount adjacent to the right atrium.  *** No previous Echo exam.  --------------------------------------    < end of copied text >      ASSESSMENT/PLAN: 	    Patient is a 56 Y/O Female with PMhx of HLD, Uterine fibroids, S/P resection, Sarcoma (inactive), S/p resection/RT, SBO presenting with LUQ pain. Patient had colonoscopy on Tuesday, endorsed onset of lower abdominal pain radiating to the back on Wednesday. Also endorses that pain is pleuritic, denies current fevers, nausea, vomiting, decreased p.o. intake.    Denies cough, dysuria. Found to have pericardial effusion on CT       Problem/Plan- 1:  Problem: Pericardial effusion   Plan:   Hgb stable, cont to trend   TTE shows small loculated pericardial effusion  Will consult Thoracic Sx for further eval  Pt asymptomatic    Problem/Plan- 2:  Problem: Subcapsular splenic hematoma.  Plan: Hgb stable   No acute intervention per surg  [place] following     Problem/Plan- 3:  Problem: HLD   Plan: c/w Lipitor 10mg   obtain lipid panel    Problem/Plan- 4:  Problem: Sarcoma  Plan: In remission    Problem/Plan- 5:  Problem: Aortic Calcified Plaque  Plan: Large calcified plaque noted on descending aorta  - c/w Atorvastatin 10mg PO daily (Would check lipids and consider increase in statin dose)  - Recommend starting ASA 81mg PO daily    EDWIN Naik-SARY Naylor DO formerly [facility]  Cardiovascular Medicine  [address], [address]  Available through call or text on [employer]  Office: [phone]

## 2023-02-13 NOTE — PROGRESS NOTE ADULT - SUBJECTIVE AND OBJECTIVE BOX
Name of Patient : GUILHERME MACKENZIE  MRN: [id number]  Date of visit: 02-13-23 @ 17:22      Subjective: Patient seen and examined. No new events except as noted.   Patient seen in Red [facility]. Lying down in bed   Appears comfortable. Denies CP, palpitations, SOB or dyspnea    REVIEW OF SYSTEMS:    CONSTITUTIONAL: No weakness, fevers or chills  EYES/ENT: No visual changes;  No vertigo or throat pain   NECK: No pain or stiffness  RESPIRATORY: No cough, wheezing, hemoptysis; No shortness of breath  CARDIOVASCULAR: No chest pain or palpitations  GASTROINTESTINAL: No abdominal or epigastric pain. No nausea, vomiting, or hematemesis; No diarrhea or constipation. No melena or hematochezia.  GENITOURINARY: No dysuria, frequency or hematuria  NEUROLOGICAL: No numbness or weakness  SKIN: No itching, burning, rashes, or lesions   All other review of systems is negative unless indicated above.    MEDICATIONS:  MEDICATIONS  (STANDING):  acetaminophen   IVPB .. 1000 milliGRAM(s) IV Intermittent once  aspirin enteric coated 81 milliGRAM(s) Oral daily  atorvastatin 10 milliGRAM(s) Oral at bedtime  oxyCODONE    IR 5 milliGRAM(s) Oral every 12 hours  venlafaxine XR 75 milliGRAM(s) Oral daily  venlafaxine XR 37.5 milliGRAM(s) Oral daily      PHYSICAL EXAM:  T(C): 36.7 (02-13-23 @ 16:01), Max: 37 (02-13-23 @ 04:15)  HR: 76 (02-13-23 @ 16:01) (73 - 76)  BP: 111/66 (02-13-23 @ 16:01) (102/65 - 124/74)  RR: 17 (02-13-23 @ 16:01) (17 - 18)  SpO2: 96% (02-13-23 @ 16:01) (96% - 98%)  Wt(kg): --  I&O's Summary        Appearance: Normal	  HEENT:  Eyes are open  Lymphatic: No lymphadenopathy   Cardiovascular: Normal S1 S2, no JVD  Respiratory: normal effort , clear  Gastrointestinal:  Soft, Non-tender  Skin: No rashes,  warm to touch  Psychiatry:  Mood & affect appropriate  Musculuskeletal: No edema                            10.7   7.89  )-----------( 229      ( 13 Feb 2023 05:57 )             32.4               02-13    145  |  107  |  13  ----------------------------<  89  4.1   |  25  |  0.67    Ca    9.0      13 Feb 2023 05:57    TPro  6.5  /  Alb  3.4  /  TBili  0.4  /  DBili  x   /  AST  14  /  ALT  5<L>  /  AlkPhos  80  02-13    PT/INR - ( 11 Feb 2023 19:07 )   PT: 12.8 sec;   INR: 1.10 ratio         PTT - ( 11 Feb 2023 19:07 )  PTT:28.5 sec                   Urinalysis Basic - ( 12 Feb 2023 00:35 )    Color: Light Yellow / Appearance: Clear / SG: >1.050 / pH: x  Gluc: x / Ketone: Small  / Bili: Negative / Urobili: Negative   Blood: x / Protein: 30 mg/dL / Nitrite: Negative   Leuk Esterase: Negative / RBC: 24 /hpf / WBC 14 /HPF   Sq Epi: x / Non Sq Epi: 0 /hpf / Bacteria: Negative      < from: Transthoracic Echocardiogram (02.12.23 @ 07:19) >    Patient name: GUILHERME MACKENZIE  YOB: 1965   Age: 57 (F)   MR#: [id number]  Study Date: 2/12/2023  Location: O/PSonographer: Angelina Mejía DONNA  Study quality: Technically fair  Referring Physician: Eduardo Coffey MD  Blood Pressure: 133/77 mmHg  Height: 155 cm  Weight: 63 kg  BSA: 1.6 m2  Heart Rate: 82 mmHg  ------------------------------------------------------------------------  PROCEDURE: Transthoracic echocardiogram with 2-D, M-Mode  and complete spectral and color flow Doppler. Strain  Imaging.  INDICATION: Other pericardial effusion (noninflammatory)  (I31.39)  ------------------------------------------------------------------------  Dimensions:    Normal Values:  LA:     3.5    2.0 - 4.0 cm  Ao:     3.3    2.0 - 3.8 cm  SEPTUM: 0.8    0.6 - 1.2 cm  PWT:    1.0    0.6 - 1.1 cm  LVIDd:  4.0    3.0 - 5.6 cm  LVIDs:  2.6    1.8 - 4.0 cm  Derived variables:  LVMI: 68 g/m2  RWT: 0.50  Fractional short: 35 %  EF (Modified Del Rosario Rule): 68 %Doppler Peak Velocity  (m/sec): AoV=1.4  ------------------------------------------------------------------------  Observations:  Mitral Valve: Normal mitral valve. Minimal mitral  regurgitation.  Aortic Valve/Aorta: Normal trileaflet aortic valve. Peak  transaortic valve gradient equals 8 mm Hg. No aortic valve  regurgitation seen. Peak left ventricular outflow tract  gradient equals 6 mm Hg, mean gradient is equal to 2 mm Hg,  LVOT velocity time integral equals 21 cm.  Aortic Root: 3.3 cm. The descending thoracic aorta is 2.6  cm with a large piece of calcific plaque (1.4 x 1.1 cm).  Left Atrium: Normal left atrium.  LA volume index = 29  cc/m2.  Left Ventricle: Normal left ventricular systolic function.  No segmental wall motion abnormalities. LVEF calculated  using biplane Del Rosario's method is 68%. Normal left  ventricular internal dimensions and wall thicknesses.  Normal diastolic function.  Right Heart: Normal right atrium. Normal right ventricular  size and function. Normal tricuspid valve. No tricuspid  regurgitation. Normal pulmonic valve.  Pericardium/Pleura: Small loculated pericardial effusion,  seen posterior to the left ventricle (0.9 cm), anterior to  the RVOT (1.2 cm) and a trivial amount adjacent to the  right atrium.  Hemodynamic: IVC is normal. Unable to estimate RVSP.  ------------------------------------------------------------------------  Conclusions:  1. Normal mitral valve. Minimal mitral regurgitation.  2. Aortic Root: 3.3 cm. The descending thoracic aorta is  2.6 cm with a large piece of calcific plaque (1.4 x 1.1  cm).  3. Normal left atrium.  LA volume index = 29 cc/m2.Normal  left ventricular internal dimensions and wall  thicknesses.Normal left ventricular systolic function. No  segmental wall motion abnormalities. LVEF calculatedusing  biplane Del Rosario's method is 68%. Strain imaging was  performed with a HR of 77 bpm and a BP of 133/77. Global L.  Strain= -28%. Normal diastolic function.  4. Normal right atrium.Normal right ventricular size and  function.  5. Normal tricuspid valve. No tricuspid  regurgitation.Unable to estimate RVSP.  6. Small loculated pericardial effusion, seen posterior to  the left ventricle (0.9 cm), anterior to the RVOT (1.2 cm)  and a trivial amount adjacent to the right atrium.  *** No previous Echo exam.  ------------------------------------------------------------------------  Confirmed on  2/12/2023 - 09:45:16 by Gracy Atwood M.D.  ------------------------------------------------------------------------    < end of copied text >

## 2023-02-13 NOTE — PROGRESS NOTE ADULT - ASSESSMENT
Patient is a 56 Y/O Female with PMhx of HLD, Uterine fibroids, S/P resection, Sarcoma (inactive), S/p resection/RT, SBO presenting with LUQ pain. Patient had colonoscopy on Tuesday, endorsed onset of lower abdominal pain radiating to the back on Wednesday. Also endorses that pain is pleuritic, denies current fevers, nausea, vomiting, decreased p.o. intake.   Endorses fever x1 day.  Denies cough, dysuria. found ot have pericardial Effusion on CT       # Pericardial effusion   - Unknown etiology seen on CT  - TTE with EF of 68%, calcific plaque, small loculated pericardial effusion; discussed with cardio - start ASA and check lipid panel. May need to increase statin dosage   - Card eval appreciated; F/u recs  - Serial EKG and CE if symptoms arise  - Thoracic surgery eval consulted; F/u recs    # subcapsular splenic hematoma.  abdominal discomfort  surg eval appreciated; no intervention at this time   GI eval called ; likely postcolonoscopy   ? post colonoscopy trauma     # Sarcoma   on remission   completed course of treatment  surveillance CT yearly       # HLD   statin   DASH diet     # ANxiety/ depresison   home meds    PPX    Discussed with patient and mother at the bedside.

## 2023-02-13 NOTE — CONSULT NOTE ADULT - ASSESSMENT
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 LAW Lynch), now found to have small-moderate loculated pericardial effusion of unclear etiology.    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 LAW Lynch), now found to have small-moderate loculated pericardial effusion of unclear etiology.    PLAN  - No signs of tamponade, no acute thoracic surgical intervention indicated  - Effusion is small and appears to be chronic  - Discussed with Dr. Patel    Thoracic    [id number]

## 2023-02-13 NOTE — CONSULT NOTE ADULT - ASSESSMENT
57 year old female with a splenic laceration post colonoscopy    1. Pleuritic chest pain  -per cardiology    2. Splenic laceration with acute blood loss anemia. h/h stable. pain improving. appreciate surgery.   -avoid blood thinners  -advance diet as tolerated  -called her GI Dr. Felix to notify but all representatives are busy, no answer. Will try to call back later.    3. History of sarcoma    4. Pericardial effusion        Advanced care planning forms were discussed. Code status including forceful chest compressions, defibrillation and intubation were discussed. The risks benefits and alternatives to pertinent gastrointestinal procedures and interventions were discussed in detail and all questions were answered. Duration: 15 Minutes.    [facility]  David Smith M.D.   [address], [place]  Office: [phone]

## 2023-02-14 ENCOUNTER — TRANSCRIPTION ENCOUNTER (OUTPATIENT)
Age: 58
End: 2023-02-14

## 2023-02-14 LAB
CHOLEST SERPL-MCNC: 152 MG/DL — SIGNIFICANT CHANGE UP
HDLC SERPL-MCNC: 64 MG/DL — SIGNIFICANT CHANGE UP
LIPID PNL WITH DIRECT LDL SERPL: 68 MG/DL — SIGNIFICANT CHANGE UP
NON HDL CHOLESTEROL: 89 MG/DL — SIGNIFICANT CHANGE UP
TRIGL SERPL-MCNC: 104 MG/DL — SIGNIFICANT CHANGE UP

## 2023-02-14 PROCEDURE — 93308 TTE F-UP OR LMTD: CPT | Mod: 26

## 2023-02-14 PROCEDURE — 93321 DOPPLER ECHO F-UP/LMTD STD: CPT | Mod: 26

## 2023-02-14 RX ORDER — QUETIAPINE FUMARATE 200 MG/1
25 TABLET, FILM COATED ORAL AT BEDTIME
Refills: 0 | Status: DISCONTINUED | OUTPATIENT
Start: 2023-02-14 | End: 2023-02-15

## 2023-02-14 RX ADMIN — OXYCODONE HYDROCHLORIDE 5 MILLIGRAM(S): 5 TABLET ORAL at 17:30

## 2023-02-14 RX ADMIN — QUETIAPINE FUMARATE 25 MILLIGRAM(S): 200 TABLET, FILM COATED ORAL at 21:37

## 2023-02-14 RX ADMIN — Medication 75 MILLIGRAM(S): at 11:53

## 2023-02-14 RX ADMIN — OXYCODONE HYDROCHLORIDE 5 MILLIGRAM(S): 5 TABLET ORAL at 05:06

## 2023-02-14 RX ADMIN — ATORVASTATIN CALCIUM 10 MILLIGRAM(S): 80 TABLET, FILM COATED ORAL at 21:37

## 2023-02-14 RX ADMIN — Medication 37.5 MILLIGRAM(S): at 16:58

## 2023-02-14 RX ADMIN — OXYCODONE HYDROCHLORIDE 5 MILLIGRAM(S): 5 TABLET ORAL at 18:44

## 2023-02-14 NOTE — PROGRESS NOTE ADULT - SUBJECTIVE AND OBJECTIVE BOX
DATE OF SERVICE: 02-14-23 @ 14:29    Patient is a 57y old  Female who presents with a chief complaint of  LUQ  pain   (12 Feb 2023 21:24)      INTERVAL HISTORY: Feels ok.     REVIEW OF SYSTEMS:  CONSTITUTIONAL: No weakness  EYES/ENT: No visual changes;  No throat pain   NECK: No pain or stiffness  RESPIRATORY: No cough, wheezing; No shortness of breath  CARDIOVASCULAR: No chest pain or palpitations  GASTROINTESTINAL: No abdominal  pain. No nausea, vomiting, or hematemesis  GENITOURINARY: No dysuria, frequency or hematuria  NEUROLOGICAL: No stroke like symptoms  SKIN: No rashes  	  MEDICATIONS:        PHYSICAL EXAM:  T(C): 36.8 (02-14-23 @ 11:05), Max: 36.9 (02-13-23 @ 21:01)  HR: 81 (02-14-23 @ 11:05) (72 - 81)  BP: 93/57 (02-14-23 @ 11:05) (93/57 - 111/74)  RR: 18 (02-14-23 @ 11:05) (17 - 18)  SpO2: 97% (02-14-23 @ 11:05) (95% - 98%)  Wt(kg): --  I&O's Summary        Appearance: In no distress	  HEENT:    PERRL, EOMI	  Cardiovascular:  S1 S2, No JVD  Respiratory: Lungs clear to auscultation	  Gastrointestinal:  Soft, Non-tender, + BS	  Vascularature:  No edema of LE  Psychiatric: Appropriate affect   Neuro: no acute focal deficits                               10.7   7.89  )-----------( 229      ( 13 Feb 2023 05:57 )             32.4     02-13    145  |  107  |  13  ----------------------------<  89  4.1   |  25  |  0.67    Ca    9.0      13 Feb 2023 05:57    TPro  6.5  /  Alb  3.4  /  TBili  0.4  /  DBili  x   /  AST  14  /  ALT  5<L>  /  AlkPhos  80  02-13        Labs personally reviewed      ASSESSMENT/PLAN: 	    Patient is a 58 Y/O Female with PMhx of HLD, Uterine fibroids, S/P resection, Sarcoma (inactive), S/p resection/RT, SBO presenting with LUQ pain. Patient had colonoscopy on Tuesday, endorsed onset of lower abdominal pain radiating to the back on Wednesday. Also endorses that pain is pleuritic, denies current fevers, nausea, vomiting, decreased p.o. intake.    Denies cough, dysuria. Found to have pericardial effusion on CT       Problem/Plan- 1:  Problem: Pericardial effusion   Plan:   Hgb stable, cont to trend   TTE shows small loculated pericardial effusion  Thoracic Sx Consult appreciated- no acute intervention at this time  Pt asymptomatic  Plan for repeat limited TTE today to assess pericardial effusion.     Problem/Plan- 2:  Problem: Subcapsular splenic hematoma.  Plan: Hgb stable   No acute intervention per surg  [place] following   Hold ASA for 3 days as per Surgery    Problem/Plan- 3:  Problem: HLD   Plan: c/w Lipitor 10mg   LDL 68. Cont current dose.     Problem/Plan- 4:  Problem: Sarcoma  Plan: In remission    Problem/Plan- 5:  Problem: Aortic Calcified Plaque  Plan: Large calcified plaque noted on descending aorta  - c/w Atorvastatin 10mg PO daily   - Recommend starting ASA 81mg PO daily when cleared by GI        Gina Gagnon, EDWIN-NP   Dwayne Naylor DO [facility]  Cardiovascular Medicine  [address] [place] [address], [address]  Available through call or text on [employer]  Office: [phone]   DATE OF SERVICE: 02-14-23 @ 14:29    Patient is a 57y old  Female who presents with a chief complaint of  LUQ  pain   (12 Feb 2023 21:24)      INTERVAL HISTORY: Feels ok.     REVIEW OF SYSTEMS:  CONSTITUTIONAL: No weakness  EYES/ENT: No visual changes;  No throat pain   NECK: No pain or stiffness  RESPIRATORY: No cough, wheezing; No shortness of breath  CARDIOVASCULAR: No chest pain or palpitations  GASTROINTESTINAL: No abdominal  pain. No nausea, vomiting, or hematemesis  GENITOURINARY: No dysuria, frequency or hematuria  NEUROLOGICAL: No stroke like symptoms  SKIN: No rashes  	  MEDICATIONS:        PHYSICAL EXAM:  T(C): 36.8 (02-14-23 @ 11:05), Max: 36.9 (02-13-23 @ 21:01)  HR: 81 (02-14-23 @ 11:05) (72 - 81)  BP: 93/57 (02-14-23 @ 11:05) (93/57 - 111/74)  RR: 18 (02-14-23 @ 11:05) (17 - 18)  SpO2: 97% (02-14-23 @ 11:05) (95% - 98%)  Wt(kg): --  I&O's Summary        Appearance: In no distress	  HEENT:    PERRL, EOMI	  Cardiovascular:  S1 S2, No JVD  Respiratory: Lungs clear to auscultation	  Gastrointestinal:  Soft, Non-tender, + BS	  Vascularature:  No edema of LE  Psychiatric: Appropriate affect   Neuro: no acute focal deficits                               10.7   7.89  )-----------( 229      ( 13 Feb 2023 05:57 )             32.4     02-13    145  |  107  |  13  ----------------------------<  89  4.1   |  25  |  0.67    Ca    9.0      13 Feb 2023 05:57    TPro  6.5  /  Alb  3.4  /  TBili  0.4  /  DBili  x   /  AST  14  /  ALT  5<L>  /  AlkPhos  80  02-13        Labs personally reviewed      ASSESSMENT/PLAN: 	    Patient is a 56 Y/O Female with PMhx of HLD, Uterine fibroids, S/P resection, Sarcoma (inactive), S/p resection/RT, SBO presenting with LUQ pain. Patient had colonoscopy on Tuesday, endorsed onset of lower abdominal pain radiating to the back on Wednesday. Also endorses that pain is pleuritic, denies current fevers, nausea, vomiting, decreased p.o. intake.    Denies cough, dysuria. Found to have pericardial effusion on CT       Problem/Plan- 1:  Problem: Pericardial effusion   Plan:   Hgb stable, cont to trend   TTE shows small loculated pericardial effusion  Thoracic Sx Consult appreciated- no acute intervention at this time  Pt asymptomatic  Repeat TTE with stable/smaller effusion    Problem/Plan- 2:  Problem: Subcapsular splenic hematoma.  Plan: Hgb stable   No acute intervention per surg  [place] following   Hold ASA for 3 days as per Surgery    Problem/Plan- 3:  Problem: HLD   Plan: c/w Lipitor 10mg   LDL 68. Cont current dose.     Problem/Plan- 4:  Problem: Sarcoma  Plan: In remission    Problem/Plan- 5:  Problem: Aortic Calcified Plaque  Plan: Large calcified plaque noted on descending aorta  - c/w Atorvastatin 10mg PO daily   - Recommend starting ASA 81mg PO daily when cleared by GI        EDWIN Naik-NP   Dwayne Naylor DO [facility]  Cardiovascular Medicine  [address] [place] [address], [address]  Available through call or text on [employer]  Office: [phone]

## 2023-02-14 NOTE — DISCHARGE NOTE NURSING/CASE MANAGEMENT/SOCIAL WORK - NSDCPEFALRISK_GEN_ALL_CORE
For information on Fall & Injury Prevention, visit: [email or web address] OR  [email or web address] OR  https://www.cdc.gov/steadi/patient.html

## 2023-02-14 NOTE — PROGRESS NOTE ADULT - ASSESSMENT
57y Female with hx of right groin sarcoma s/p radiation and excision, SBO s/p ex lap, SBR, primary anastomosis. Here with 4 days of LUQ pain, after colonoscopy. Vitals and labs stable. Imaging shows subcapsular splenic hematoma. Tolerating diet.     Plan:  - no acute surgical intervention at this time  - reports pain to be mostly positional and well controlled   - no contraindications to discharge   - Surgery to sign off     - plan dw attending    Surgery  [id number] 57y Female with hx of right groin sarcoma s/p radiation and excision, SBO s/p ex lap, SBR, primary anastomosis. Here with 4 days of LUQ pain, after colonoscopy. Vitals and labs stable. Imaging shows subcapsular splenic hematoma. Tolerating diet.     Plan:  - no acute surgical intervention at this time  - reports pain to be mostly positional and well controlled   - no contraindications to discharge   - Surgery to sign off   - Patient may follow up outpatient with Dr. Lynch     - plan dw attending    Surgery  [id number]

## 2023-02-14 NOTE — PROGRESS NOTE ADULT - ASSESSMENT
57 year old female with a splenic laceration post colonoscopy    1. Pleuritic chest pain  -per cardiology    2. Splenic laceration with acute blood loss anemia. h/h stable. pain improving. appreciate surgery.   -avoid blood thinners  -tolerating regular diet  -will attempt to call her GI Dr. Felix again to update (yesterday there was no answer)    3. History of sarcoma    4. Pericardial effusion  - recc holding off on starting aspirin for 3-5 days         Advanced care planning forms were discussed. Code status including forceful chest compressions, defibrillation and intubation were discussed. The risks benefits and alternatives to pertinent gastrointestinal procedures and interventions were discussed in detail and all questions were answered. Duration: 15 Minutes.    [facility]  David Smith M.D.   [address], [place]  Office: [phone]

## 2023-02-14 NOTE — PROGRESS NOTE ADULT - SUBJECTIVE AND OBJECTIVE BOX
Chief Complaint:  Patient is a 57y old  Female who presents with a chief complaint of  LUQ  pain   (12 Feb 2023 21:24)      Date of service 02-14-23 @ 14:17      Interval Events:   Patient seen and examined.   Reports mild abdominal discomfort.   States overall feeling much better.    Hospital Medications:  acetaminophen   IVPB .. 1000 milliGRAM(s) IV Intermittent once  atorvastatin 10 milliGRAM(s) Oral at bedtime  oxyCODONE    IR 5 milliGRAM(s) Oral every 12 hours  venlafaxine XR 75 milliGRAM(s) Oral daily  venlafaxine XR 37.5 milliGRAM(s) Oral daily        Review of Systems:  General:  No wt loss, fevers, chills, night sweats, fatigue,   Eyes:  Good vision, no reported pain  ENT:  No sore throat, pain, runny nose, dysphagia  CV:  No pain, palpitations, hypo/hypertension  Resp:  No dyspnea, cough, tachypnea, wheezing  GI:  See HPI  :  No pain, bleeding, incontinence, nocturia  Muscle:  No pain, weakness  Neuro:  No weakness, tingling, memory problems  Psych:  No fatigue, insomnia, mood problems, depression  Endocrine:  No polyuria, polydipsia, cold/heat intolerance  Heme:  No petechiae, ecchymosis, easy bruisability  Integumentary:  No rash, edema    PHYSICAL EXAM:   Vital Signs:  Vital Signs Last 24 Hrs  T(C): 36.8 (14 Feb 2023 11:05), Max: 36.9 (13 Feb 2023 21:01)  T(F): 98.2 (14 Feb 2023 11:05), Max: 98.4 (13 Feb 2023 21:01)  HR: 81 (14 Feb 2023 11:05) (72 - 81)  BP: 93/57 (14 Feb 2023 11:05) (93/57 - 111/74)  BP(mean): --  RR: 18 (14 Feb 2023 11:05) (17 - 18)  SpO2: 97% (14 Feb 2023 11:05) (95% - 98%)    Parameters below as of 14 Feb 2023 11:05  Patient On (Oxygen Delivery Method): room air      Daily     Daily       PHYSICAL EXAM:     GENERAL:  Appears stated age, well-groomed, well-nourished, no distress  HEENT:  NC/AT,  conjunctivae anicteric, clear and pink,   NECK: supple, trachea midline  CHEST:  Full & symmetric excursion, no increased effort, breath sounds clear  HEART:  Regular rhythm, no JVD  ABDOMEN:  Soft, non-tender, non-distended, normoactive bowel sounds,  no masses , no hepatosplenomegaly  EXTREMITIES:  no cyanosis,clubbing or edema  SKIN:  No rash, erythema, or, ecchymoses, no jaundice  NEURO:  Alert, non-focal, no asterixis  PSYCH: Appropriate affect, oriented to place and time  RECTAL: Deferred      LABS Personally reviewed by me:                        10.7   7.89  )-----------( 229      ( 13 Feb 2023 05:57 )             32.4       02-13    145  |  107  |  13  ----------------------------<  89  4.1   |  25  |  0.67    Ca    9.0      13 Feb 2023 05:57    TPro  6.5  /  Alb  3.4  /  TBili  0.4  /  DBili  x   /  AST  14  /  ALT  5<L>  /  AlkPhos  80  02-13    LIVER FUNCTIONS - ( 13 Feb 2023 05:57 )  Alb: 3.4 g/dL / Pro: 6.5 g/dL / ALK PHOS: 80 U/L / ALT: 5 U/L / AST: 14 U/L / GGT: x                                       10.7   7.89  )-----------( 229      ( 13 Feb 2023 05:57 )             32.4                         10.8   7.06  )-----------( 206      ( 12 Feb 2023 06:21 )             32.9                         11.2   9.20  )-----------( 220      ( 11 Feb 2023 19:07 )             34.7       Imaging personally reviewed by me:

## 2023-02-14 NOTE — PROGRESS NOTE ADULT - ASSESSMENT
Patient is a 56 Y/O Female with PMhx of HLD, Uterine fibroids, S/P resection, Sarcoma (inactive), S/p resection/RT, SBO presenting with LUQ pain. Patient had colonoscopy on Tuesday, endorsed onset of lower abdominal pain radiating to the back on Wednesday. Also endorses that pain is pleuritic, denies current fevers, nausea, vomiting, decreased p.o. intake.   Endorses fever x1 day.  Denies cough, dysuria. found ot have pericardial Effusion on CT       # Pericardial effusion   - Unknown etiology seen on CT  - TTE with EF of 68%, calcific plaque, small loculated pericardial effusion; discussed with cardio - start ASA once cleared from GI standpoint   - Card eval appreciated; F/u recs  - Serial EKG and CE if symptoms arise  - Thoracic surgery eval consulted; F/u recs-- No intervention at this time     # subcapsular splenic hematoma.  - abdominal discomfort  - Surg eval appreciated; no intervention at this time   - GI eval called ; likely postcolonoscopy trauma   - Start ASA once cleared from GI standpoint     # Sarcoma   on remission   completed course of treatment  surveillance CT yearly       # HLD   statin   DASH diet     # ANxiety/ depresison   home meds    PPX     Patient is a 58 Y/O Female with PMhx of HLD, Uterine fibroids, S/P resection, Sarcoma (inactive), S/p resection/RT, SBO presenting with LUQ pain. Patient had colonoscopy on Tuesday, endorsed onset of lower abdominal pain radiating to the back on Wednesday. Also endorses that pain is pleuritic, denies current fevers, nausea, vomiting, decreased p.o. intake.   Endorses fever x1 day.  Denies cough, dysuria. found ot have pericardial Effusion on CT       # Pericardial effusion   - Unknown etiology seen on CT  - TTE with EF of 68%, calcific plaque, small loculated pericardial effusion; discussed with cardio - start ASA once cleared from GI standpoint   - Card eval appreciated; F/u recs  - Serial EKG and CE if symptoms arise  - Thoracic surgery eval consulted; F/u recs-- No intervention at this time     # subcapsular splenic hematoma.  - abdominal discomfort  - Surg eval appreciated; no intervention at this time   - GI eval called ; likely postcolonoscopy trauma   - Start ASA once cleared from GI standpoint     # Sarcoma   on remission   completed course of treatment  surveillance CT yearly     # + UCx  - Patien denies symotoms, 10-49K, monitor off of ABX for now unless symptoms arise     # HLD   statin   DASH diet     # ANxiety/ depresison   home meds    PPX

## 2023-02-14 NOTE — DISCHARGE NOTE NURSING/CASE MANAGEMENT/SOCIAL WORK - NSDCFUADDAPPT_GEN_ALL_CORE_FT
APPTS ARE READY TO BE MADE: [ ] YES    Best Family or Patient Contact (if needed):    Additional Information about above appointments (if needed):    1: PCP Dr. Nikolas Rodriguez [phone] within 1 week of discharge  2: Cardiology within 1 week  3: GI within 1 week of discharge    Other comments or requests:

## 2023-02-14 NOTE — PROGRESS NOTE ADULT - SUBJECTIVE AND OBJECTIVE BOX
24h Events:  No acute events overnight.    Subjective:   Patient seen at bedside this AM.     Objective:  Vital Signs  T(C): 36.8 (02-14 @ 11:05), Max: 36.9 (02-13 @ 21:01)  HR: 81 (02-14 @ 11:05) (72 - 81)  BP: 93/57 (02-14 @ 11:05) (93/57 - 111/74)  RR: 18 (02-14 @ 11:05) (17 - 18)  SpO2: 97% (02-14 @ 11:05) (95% - 98%)    Physical Exam:  GEN: NAD  RESP: RA  ABD: s, nd, ttp LUQ w/o rigidity, guarding, rebound   EXTR: WWP  NEURO: A/Ox4     Labs:                        10.7   7.89  )-----------( 229      ( 13 Feb 2023 05:57 )             32.4   02-13    145  |  107  |  13  ----------------------------<  89  4.1   |  25  |  0.67    Ca    9.0      13 Feb 2023 05:57    TPro  6.5  /  Alb  3.4  /  TBili  0.4  /  DBili  x   /  AST  14  /  ALT  5<L>  /  AlkPhos  80  02-13    CAPILLARY BLOOD GLUCOSE          Imaging:

## 2023-02-14 NOTE — DISCHARGE NOTE NURSING/CASE MANAGEMENT/SOCIAL WORK - PATIENT PORTAL LINK FT
You can access the FollowMyHealth Patient Portal offered by [facility] by registering at the following website: [email or web address]. By joining [employer]’s FollowMyHealth portal, you will also be able to view your health information using other applications (apps) compatible with our system.

## 2023-02-14 NOTE — PROGRESS NOTE ADULT - SUBJECTIVE AND OBJECTIVE BOX
Name of Patient : GUILHERME MACKENZIE  MRN: [id number]  Date of visit: 02-14-23 @ 12:59      Subjective: Patient seen and examined. No new events except as noted.   Patient seen earlier this AM. Sitting up in bed,. Reports feeling better. Denies chest pain, palpitations, SOB, or dyspnea.   Pending limited TTE today   Reports mild abdominal discomfort     REVIEW OF SYSTEMS:    CONSTITUTIONAL: No weakness, fevers or chills  EYES/ENT: No visual changes;  No vertigo or throat pain   NECK: No pain or stiffness  RESPIRATORY: No cough, wheezing, hemoptysis; No shortness of breath  CARDIOVASCULAR: No chest pain or palpitations  GASTROINTESTINAL: No abdominal or epigastric pain. No nausea, vomiting, or hematemesis; No diarrhea or constipation. No melena or hematochezia.  GENITOURINARY: No dysuria, frequency or hematuria  NEUROLOGICAL: No numbness or weakness  SKIN: No itching, burning, rashes, or lesions   All other review of systems is negative unless indicated above.    MEDICATIONS:  MEDICATIONS  (STANDING):  acetaminophen   IVPB .. 1000 milliGRAM(s) IV Intermittent once  atorvastatin 10 milliGRAM(s) Oral at bedtime  oxyCODONE    IR 5 milliGRAM(s) Oral every 12 hours  venlafaxine XR 75 milliGRAM(s) Oral daily  venlafaxine XR 37.5 milliGRAM(s) Oral daily      PHYSICAL EXAM:  T(C): 36.8 (02-14-23 @ 11:05), Max: 36.9 (02-13-23 @ 21:01)  HR: 81 (02-14-23 @ 11:05) (72 - 81)  BP: 93/57 (02-14-23 @ 11:05) (93/57 - 111/74)  RR: 18 (02-14-23 @ 11:05) (17 - 18)  SpO2: 97% (02-14-23 @ 11:05) (95% - 98%)  Wt(kg): --  I&O's Summary        Appearance: Normal	  HEENT: Eyes are open   Lymphatic: No lymphadenopathy   Cardiovascular: Normal S1 S2, no JVD  Respiratory: normal effort , clear  Gastrointestinal:  Soft, mild left sided tenderness   Skin: No rashes,  warm to touch  Psychiatry:  Mood & affect appropriate  Musculoskeletal: No edema                          10.7   7.89  )-----------( 229      ( 13 Feb 2023 05:57 )             32.4               02-13    145  |  107  |  13  ----------------------------<  89  4.1   |  25  |  0.67    Ca    9.0      13 Feb 2023 05:57    TPro  6.5  /  Alb  3.4  /  TBili  0.4  /  DBili  x   /  AST  14  /  ALT  5<L>  /  AlkPhos  80  02-13

## 2023-02-15 VITALS
SYSTOLIC BLOOD PRESSURE: 112 MMHG | DIASTOLIC BLOOD PRESSURE: 73 MMHG | TEMPERATURE: 98 F | OXYGEN SATURATION: 97 % | RESPIRATION RATE: 18 BRPM | HEART RATE: 82 BPM

## 2023-02-15 PROCEDURE — 96374 THER/PROPH/DIAG INJ IV PUSH: CPT

## 2023-02-15 PROCEDURE — 93308 TTE F-UP OR LMTD: CPT

## 2023-02-15 PROCEDURE — 71275 CT ANGIOGRAPHY CHEST: CPT | Mod: MD

## 2023-02-15 PROCEDURE — 85027 COMPLETE CBC AUTOMATED: CPT

## 2023-02-15 PROCEDURE — 86900 BLOOD TYPING SEROLOGIC ABO: CPT

## 2023-02-15 PROCEDURE — 93356 MYOCRD STRAIN IMG SPCKL TRCK: CPT

## 2023-02-15 PROCEDURE — 85610 PROTHROMBIN TIME: CPT

## 2023-02-15 PROCEDURE — 81001 URINALYSIS AUTO W/SCOPE: CPT

## 2023-02-15 PROCEDURE — 93306 TTE W/DOPPLER COMPLETE: CPT

## 2023-02-15 PROCEDURE — G0378: CPT

## 2023-02-15 PROCEDURE — 85025 COMPLETE CBC W/AUTO DIFF WBC: CPT

## 2023-02-15 PROCEDURE — 0225U NFCT DS DNA&RNA 21 SARSCOV2: CPT

## 2023-02-15 PROCEDURE — 80053 COMPREHEN METABOLIC PANEL: CPT

## 2023-02-15 PROCEDURE — 86803 HEPATITIS C AB TEST: CPT

## 2023-02-15 PROCEDURE — 83690 ASSAY OF LIPASE: CPT

## 2023-02-15 PROCEDURE — 84443 ASSAY THYROID STIM HORMONE: CPT

## 2023-02-15 PROCEDURE — 86901 BLOOD TYPING SEROLOGIC RH(D): CPT

## 2023-02-15 PROCEDURE — 85730 THROMBOPLASTIN TIME PARTIAL: CPT

## 2023-02-15 PROCEDURE — 86850 RBC ANTIBODY SCREEN: CPT

## 2023-02-15 PROCEDURE — 99285 EMERGENCY DEPT VISIT HI MDM: CPT

## 2023-02-15 PROCEDURE — 74177 CT ABD & PELVIS W/CONTRAST: CPT | Mod: MD

## 2023-02-15 PROCEDURE — 36415 COLL VENOUS BLD VENIPUNCTURE: CPT

## 2023-02-15 PROCEDURE — 83036 HEMOGLOBIN GLYCOSYLATED A1C: CPT

## 2023-02-15 PROCEDURE — 93321 DOPPLER ECHO F-UP/LMTD STD: CPT

## 2023-02-15 PROCEDURE — 93005 ELECTROCARDIOGRAM TRACING: CPT

## 2023-02-15 PROCEDURE — 74019 RADEX ABDOMEN 2 VIEWS: CPT

## 2023-02-15 PROCEDURE — 71046 X-RAY EXAM CHEST 2 VIEWS: CPT

## 2023-02-15 PROCEDURE — 87086 URINE CULTURE/COLONY COUNT: CPT

## 2023-02-15 PROCEDURE — 80061 LIPID PANEL: CPT

## 2023-02-15 RX ADMIN — OXYCODONE HYDROCHLORIDE 5 MILLIGRAM(S): 5 TABLET ORAL at 07:10

## 2023-02-15 RX ADMIN — Medication 75 MILLIGRAM(S): at 11:47

## 2023-02-15 RX ADMIN — Medication 37.5 MILLIGRAM(S): at 11:47

## 2023-02-15 NOTE — PROGRESS NOTE ADULT - SUBJECTIVE AND OBJECTIVE BOX
Name of Patient : GUILHERME MACKENZIE  MRN: [id number]  Date of visit: 02-15-23        Subjective: Patient seen and examined. No new events except as noted.   Patient seen earlier this AM. Mother at the bedside  Patient denies CP, palpitations, SOB or dyspnea.   S/P Limited TTE yesterday     REVIEW OF SYSTEMS:    CONSTITUTIONAL: No weakness, fevers or chills  EYES/ENT: No visual changes;  No vertigo or throat pain   NECK: No pain or stiffness  RESPIRATORY: No cough, wheezing, hemoptysis; No shortness of breath  CARDIOVASCULAR: No chest pain or palpitations  GASTROINTESTINAL: No abdominal or epigastric pain. No nausea, vomiting, or hematemesis; No diarrhea or constipation. No melena or hematochezia.  GENITOURINARY: No dysuria, frequency or hematuria  NEUROLOGICAL: No numbness or weakness  SKIN: No itching, burning, rashes, or lesions   All other review of systems is negative unless indicated above.    MEDICATIONS:  MEDICATIONS  (STANDING):  acetaminophen   IVPB .. 1000 milliGRAM(s) IV Intermittent once  atorvastatin 10 milliGRAM(s) Oral at bedtime  oxyCODONE    IR 5 milliGRAM(s) Oral every 12 hours  QUEtiapine 25 milliGRAM(s) Oral at bedtime  venlafaxine XR 75 milliGRAM(s) Oral daily  venlafaxine XR 37.5 milliGRAM(s) Oral daily      PHYSICAL EXAM:  T(C): 36.7 (02-15-23 @ 11:45), Max: 36.8 (02-14-23 @ 17:15)  HR: 68 (02-15-23 @ 11:45) (68 - 89)  BP: 100/69 (02-15-23 @ 11:45) (100/69 - 170/70)  RR: 19 (02-15-23 @ 11:45) (18 - 19)  SpO2: 98% (02-15-23 @ 11:45) (94% - 98%)  Wt(kg): --  I&O's Summary        Appearance: Normal	  HEENT:  Eyes are open   Lymphatic: No lymphadenopathy   Cardiovascular: Normal S1 S2, no JVD  Respiratory: normal effort , clear  Gastrointestinal:  Soft, Non-tender  Skin: No rashes,  warm to touch  Psychiatry:  Mood & affect appropriate  Musculoskeletal: No edema      < from: Limited Transthoracic Echo (02.14.23 @ 12:05) >    Patient name: GUILHERME MACKENZIE  YOB: 1965   Age: 57 (F)   MR#: [id number]  Study Date: 2/14/2023  [id number]: JOSIANE Foley  Study quality: Technically good  Referring Physician: Jv Zimmerman MD  Blood Pressure: 93/57 mmHg  Height: 155 cm  Weight: 63 kg  BSA: 1.6 m2  ------------------------------------------------------------------------  PROCEDURE: Limited transthoracic echocardiogram with 2-D.  M-Mode and spectral and color flow Doppler.  INDICATION: Other pericardial effusion (noninflammatory)  (I31.39)  ------------------------------------------------------------------------  Dimensions:    Normal Values:  LA:            2.0 - 4.0 cm  Ao:            2.0 - 3.8 cm  SEPTUM:        0.6 - 1.2 cm  PWT:         0.6 - 1.1 cm  LVIDd:         3.0 - 5.6 cm  LVIDs:         1.8 - 4.0 cm  EF (Visual Estimate): NA %  ------------------------------------------------------------------------  Observations:  Mitral Valve: Normal mitral valve.  Aortic Valve/Aorta: Normal trileaflet aortic valve.  Normal aortic root, aortic arch and descending thoracic  aorta.  Left Ventricle: Normal left ventricular systolic function.  No segmental wall motion abnormalities. Normal left  ventricular internal dimensions and wall thicknesses.  Right Heart: Normal right atrium. Normal right ventricular  size and function. Normal tricuspid valve. Normal pulmonic  valve.  Pericardium/Pleura: Small pericardial effusion. No  hemodynamic compromise. There is trivial pericardial  effusion adjacent to the RV to the liver in a subcostal  view.  There is about 1 cm at the RV and LV apex in a  subcostal view.  Hemodynamic: IVC small with > 50% collapse. Estimated right  atrial pressure is low suggestive of volume depletion.  ------------------------------------------------------------------------  Conclusions:  1. IVC small with > 50% collapse. Estimated right atrial  pressure is low suggestive of volume depletion.  2. Small pericardial effusion. No hemodynamic compromise.  There is trivial pericardial effusion adjacent to the RV to  the liver in a subcostal view.  There is about 1 cm at the  RV and LV apex in a subcostal view.  *** Compared with echocardiogram of 2/12/2023, there is  less pericardial effusion around theright atrium and right  ventricle (in a subcostal view).  ------------------------------------------------------------------------  Confirmed on  2/14/2023 - 15:01:02 by Gracy Atwood M.D.  ------------------------------------------------------------------------    < end of copied text >

## 2023-02-15 NOTE — PROGRESS NOTE ADULT - ASSESSMENT
Patient is a 56 Y/O Female with PMhx of HLD, Uterine fibroids, S/P resection, Sarcoma (inactive), S/p resection/RT, SBO presenting with LUQ pain. Patient had colonoscopy on Tuesday, endorsed onset of lower abdominal pain radiating to the back on Wednesday. Also endorses that pain is pleuritic, denies current fevers, nausea, vomiting, decreased p.o. intake.   Endorses fever x1 day.  Denies cough, dysuria. found ot have pericardial Effusion on CT       # Pericardial effusion   - Unknown etiology seen on CT  - TTE with EF of 68%, calcific plaque, small loculated pericardial effusion; discussed with cardio - start ASA once cleared from GI standpoint   - Card eval appreciated; F/u recs  - Serial EKG and CE if symptoms arise  - Thoracic surgery eval consulted; F/u recs-- No intervention at this time   - Limited TTE with Small pericardial effusion. Patient denies symptoms  - Outpatient cardio follow up     # subcapsular splenic hematoma.  - abdominal discomfort  - Surg eval appreciated; no intervention at this time   - GI eval called ; likely postcolonoscopy trauma   - Start ASA once cleared from GI standpoint -- Planned to start 02/16 per GI     # Sarcoma   on remission   completed course of treatment  surveillance CT yearly     # + UCx  - Patien denies symotoms, 10-49K, monitor off of ABX for now unless symptoms arise     # HLD   statin   DASH diet     # ANxiety/ depresison   home meds    PPX      Discussed with Mother and patient in detail 02/15  Discharge planning for today

## 2023-02-15 NOTE — PROGRESS NOTE ADULT - SUBJECTIVE AND OBJECTIVE BOX
DATE OF SERVICE: 02-15-23 @ 12:40    Patient is a 57y old  Female who presents with a chief complaint of  LUQ  pain   (12 Feb 2023 21:24)      INTERVAL HISTORY: Feels ok.     REVIEW OF SYSTEMS:  CONSTITUTIONAL: No weakness  EYES/ENT: No visual changes;  No throat pain   NECK: No pain or stiffness  RESPIRATORY: No cough, wheezing; No shortness of breath  CARDIOVASCULAR: No chest pain or palpitations  GASTROINTESTINAL: No abdominal  pain. No nausea, vomiting, or hematemesis  GENITOURINARY: No dysuria, frequency or hematuria  NEUROLOGICAL: No stroke like symptoms  SKIN: No rashes    TELEMETRY Personally reviewed: SR 60-70  	  MEDICATIONS:        PHYSICAL EXAM:  T(C): 36.7 (02-15-23 @ 11:45), Max: 36.8 (02-14-23 @ 17:15)  HR: 68 (02-15-23 @ 11:45) (68 - 89)  BP: 100/69 (02-15-23 @ 11:45) (100/69 - 170/70)  RR: 19 (02-15-23 @ 11:45) (18 - 19)  SpO2: 98% (02-15-23 @ 11:45) (94% - 98%)  Wt(kg): --  I&O's Summary        Appearance: In no distress	  HEENT:    PERRL, EOMI	  Cardiovascular:  S1 S2, No JVD  Respiratory: Lungs clear to auscultation	  Gastrointestinal:  Soft, Non-tender, + BS	  Vascularature:  No edema of LE  Psychiatric: Appropriate affect   Neuro: no acute focal deficits         Labs personally reviewed  < from: Limited Transthoracic Echo (02.14.23 @ 12:05) >  Conclusions:  1. IVC small with > 50% collapse. Estimated right atrial  pressure is low suggestive of volume depletion.  2. Small pericardial effusion. No hemodynamic compromise.  There is trivial pericardial effusion adjacent to the RV to  the liver in a subcostal view.  There is about 1 cm at the  RV and LV apex in a subcostal view.  *** Compared with echocardiogram of 2/12/2023, there is  less pericardial effusion around theright atrium and right  ventricle (in a subcostal view).    < end of copied text >      ASSESSMENT/PLAN: 	      Patient is a 56 Y/O Female with PMhx of HLD, Uterine fibroids, S/P resection, Sarcoma (inactive), S/p resection/RT, SBO presenting with LUQ pain. Patient had colonoscopy on Tuesday, endorsed onset of lower abdominal pain radiating to the back on Wednesday. Also endorses that pain is pleuritic, denies current fevers, nausea, vomiting, decreased p.o. intake.    Denies cough, dysuria. Found to have pericardial effusion on CT       Problem/Plan- 1:  Problem: Pericardial effusion   Plan:   Hgb stable, cont to trend   TTE shows small loculated pericardial effusion  Thoracic Sx Consult appreciated- no acute intervention at this time  Pt asymptomatic  Repeat TTE with stable/smaller effusion    Problem/Plan- 2:  Problem: Subcapsular splenic hematoma.  Plan: Hgb stable   No acute intervention per surg  [place] following   Hold ASA for 3 days as per Surgery    Problem/Plan- 3:  Problem: HLD   Plan: c/w Lipitor 10mg   LDL 68. Cont current dose.     Problem/Plan- 4:  Problem: Sarcoma  Plan: In remission    Problem/Plan- 5:  Problem: Aortic Calcified Plaque  Plan: Large calcified plaque noted on descending aorta  - c/w Atorvastatin 10mg PO daily   - Recommend starting ASA 81mg PO daily when cleared by GI        EDWIN Naik-NP   Dwayne Naylor DO [facility]  Cardiovascular Medicine  [address] [place] [address], [address]  Available through call or text on [employer]  Office: [phone]

## 2023-02-15 NOTE — PROGRESS NOTE ADULT - PROVIDER SPECIALTY LIST ADULT
Cardiology
Gastroenterology
Internal Medicine
Surgery
Internal Medicine
Internal Medicine
Surgery

## 2023-02-15 NOTE — CHART NOTE - NSCHARTNOTEFT_GEN_A_CORE
Patient medically cleared per Dr. Zimmerman.   D/C meds d/w Dr. Zimmerman/ [facility].   D/C paperwork completed- may resume ASA tomorrow per GI.   VSS.

## 2023-02-15 NOTE — PROGRESS NOTE ADULT - NS ATTEND OPT1 GEN_ALL_CORE
"----- Message from ANDREA Kong sent at 12/23/2016  5:03 PM CST -----  Pt will need to be seen in the office for evaluation prior to be setting up for cscope  (the date she has scheduled can be turned into a \"tentative date\" pending she attend office visit)  GI did not evaluate her while in the hospital  She can come see me the week I am back from vacation.  She may come Tue, Wed, or Thur at 8:30.    jose juan Martínez    ----- Message -----     From: Yamileth Mcleod MA     Sent: 12/23/2016  11:50 AM       To: ANDREA Kong    PT CALLED STATING SHE WAS D/C YESTERDAY AND WAS TOLD TO CALL TO SET UP A COLON IN 4WKS  [facility] RADIOLOGY    "
I attest my time as attending is greater than 50% of the total combined time spent on qualifying patient care activities by the PA/NP and attending.

## 2023-02-15 NOTE — PROGRESS NOTE ADULT - NS ATTEND AMEND GEN_ALL_CORE FT
Patient care and plan discussed and reviewed with Advanced Care Provider. Plan as outlined above edited by me to reflect our discussion.
Pt care and plan discussed and reviewed with PA. Plan as outlined above edited by me to reflect our discussion. Advanced care planning/advanced directives discussed with patient/family. DNR status including forceful chest compressions to attempt to restart the heart, ventilator support/artificial breathing, electric shock, artificial nutrition, health care proxy, Molst form all discussed with pt. Pt wishes to consider.
Patient care and plan discussed and reviewed with Advanced Care Provider. Plan as outlined above edited by me to reflect our discussion.
Pt care and plan discussed and reviewed with PA. Plan as outlined above edited by me to reflect our discussion. Advanced care planning/advanced directives discussed with patient/family. DNR status including forceful chest compressions to attempt to restart the heart, ventilator support/artificial breathing, electric shock, artificial nutrition, health care proxy, Molst form all discussed with pt.
Patient care and plan discussed and reviewed with Advanced Care Provider. Plan as outlined above edited by me to reflect our discussion.
Pt care and plan discussed and reviewed with PA. Plan as outlined above edited by me to reflect our discussion. Advanced care planning/advanced directives discussed with patient/family. DNR status including forceful chest compressions to attempt to restart the heart, ventilator support/artificial breathing, electric shock, artificial nutrition, health care proxy, Molst form all discussed with pt.

## 2023-02-22 PROBLEM — Z92.29 PERSONAL HISTORY OF OTHER DRUG THERAPY: Chronic | Status: ACTIVE | Noted: 2023-02-11

## 2023-02-22 PROBLEM — U07.1 COVID-19: Chronic | Status: ACTIVE | Noted: 2023-02-11

## 2023-02-28 ENCOUNTER — APPOINTMENT (OUTPATIENT)
Dept: PAIN MANAGEMENT | Facility: CLINIC | Age: 58
End: 2023-02-28
Payer: COMMERCIAL

## 2023-02-28 PROCEDURE — 99212 OFFICE O/P EST SF 10 MIN: CPT | Mod: 95

## 2023-02-28 NOTE — ASSESSMENT
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 \par \par See is seeing Dr. Naylor- Cardio for repeat ECHO.  \par will prescribe Oxycodone 5 mg 2-3x/day qty 70 \par I-Stop reviewed,  reference #: [id number]\par No signs of aberrant behavior and will continue to monitor for signs of toxicity.\par Reminded to continue to avoid alcohol.\par Safe storage of medication was reviewed. \par \par \par \par \par \par \par \par \par

## 2023-02-28 NOTE — HISTORY OF PRESENT ILLNESS
[FreeTextEntry1] : 58 yo RH female Pmhx HLD, Depression, recurrent sarcoma s/p surgery involving right hip region x 2  2011, 2014 with chronic pain syndrome who presents today via TEB for follow up.  Since her last visit reports she had routine colonoscopy complicated by splenic rupture and hematoma requiring hospitalization x 5 days. Hospital course noted to have pericardial effusion and following up with cardio. \par \par   She continues to report pain daily described as a dull ache in right thigh and groin described as a dull ache 2/10 with current meds and 5/10 up to 5/10. Pain increased with activity, ambulation as well as damp, cold weather and decreased with leg elevation. \par \par Prior meds: Methadone 5 mgs qhs\par Current meds include: Oxycodone 5 mg 2x/day qty 60, Effexor 37.5mg/75mg qam, Seroquel 25 mg q hs.  Ibuprofen prn. 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 \par \par \par

## 2023-03-01 ENCOUNTER — APPOINTMENT (OUTPATIENT)
Dept: ULTRASOUND IMAGING | Facility: CLINIC | Age: 58
End: 2023-03-01
Payer: COMMERCIAL

## 2023-03-01 PROCEDURE — 76700 US EXAM ABDOM COMPLETE: CPT

## 2023-03-03 DIAGNOSIS — G89.4 CHRONIC PAIN SYNDROME: ICD-10-CM

## 2023-03-11 ENCOUNTER — APPOINTMENT (OUTPATIENT)
Dept: CARDIOLOGY | Facility: CLINIC | Age: 58
End: 2023-03-11
Payer: COMMERCIAL

## 2023-03-11 DIAGNOSIS — R01.1 CARDIAC MURMUR, UNSPECIFIED: ICD-10-CM

## 2023-03-11 PROCEDURE — 93306 TTE W/DOPPLER COMPLETE: CPT

## 2023-03-19 PROBLEM — R01.1 MURMUR: Status: ACTIVE | Noted: 2023-03-19

## 2023-05-08 ENCOUNTER — APPOINTMENT (OUTPATIENT)
Dept: PAIN MANAGEMENT | Facility: CLINIC | Age: 58
End: 2023-05-08
Payer: COMMERCIAL

## 2023-05-08 VITALS
HEIGHT: 61 IN | WEIGHT: 131 LBS | SYSTOLIC BLOOD PRESSURE: 117 MMHG | HEART RATE: 79 BPM | BODY MASS INDEX: 24.73 KG/M2 | DIASTOLIC BLOOD PRESSURE: 63 MMHG

## 2023-05-08 PROCEDURE — 99214 OFFICE O/P EST MOD 30 MIN: CPT

## 2023-05-08 NOTE — HISTORY OF PRESENT ILLNESS
[FreeTextEntry1] : Patient reports having her sleep lacerated during a colonoscopy on February 2, 2023 noting more transient pain. For past few days co episodic burning in right groin wo any specific precipitating factor. Planning to see dermatologist. No weakness noted i lower extremity or hip region.\par \par Off methadone - on Oxycodone 5mgs 70 tablets per month. BM OK QD. Effexor 112 mgs qd - highter doses caused increase in depression.\par \par

## 2023-05-08 NOTE — PHYSICAL EXAM
[FreeTextEntry1] : Constitutional: No signs of distress or signs of toxicity. \par Mental Status: Alert and well oriented. Speech fluent. No aphasia. Fund of knowledge intact. \par Psychiatric: Mood stable.\par Cranial Nerve: PERRLA: No papilledema; No VFC: No Kristina. V1-3 intact. No facial asymmetry, hearing grossly intact; palate elevates symmetrically, tongue midline\par Motor:No involuntary movements noted.  Adequate bulk, tone throughout, 5/5 strength of all muscle groups \par DTR: present and symmetrical; no clonus, plantars  downgoing\par Sensory: intact to primary and secondary modalities.\par Gait: non antalgic or ataxic.\par Eyes: no redness or swelling\par HEENT: intact; no signs of trauma.\par Neck: No masses noted\par Pulmonary: no respiratory distress\par Vascular: no temperature, color change or sudomotor changes.; no edema\par Musculoskeletal: examination of the cervical spine reveals no midline tenderness, range of motion full upon flexion, extension and lateral rotation. Negative facet tenderness, Negative Spurlings bilaterally. examination of the lumbar spine reveals no midline or paraspinal tenderness; Range of motion full upon flexion, extension and lateral rotation; negative facet loading, No tenderness of sciatic notch, No tenderness of bilateral greater trochanters, chronic right SHAZIA pos, negative SLRT bilaterally,\par Abd: non tender; appears to have some breakdown of skin right groin.\par Skin: No rash.\par

## 2023-05-08 NOTE — ASSESSMENT
[FreeTextEntry1] : Chronic right hip and right leg pain\par New dysesthetic pain sensation right groin. \par \par I agree for patient to see dermatology ? breakdown near surgical site.\par Continue current opioid dosing. 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  [Opioids] : Patient was explained in detail about pain control by using opioids. Patient has signed and fully understands our guidelines for medication and drug screening.  Patient understands the side effects of opioids, including, but not limited to, drug tolerance, dependence, potential for addiction. This class of drugs is habit-forming and DARRELL regulated. The sedative effects of opioids can be potentiated by taking alcohol or any sleeping pills, along with opioids. The decision to drive is patient’s responsibility, as opioids can affect his/her driving ability and ability to concentrate. The long-term place is not clear, however, patient understands that once the pain control optimizes, the goal will be to wean off the opioids. All the issues regarding opioid treatment have been addressed satisfactorily.

## 2023-07-11 NOTE — H&P PST ADULT - GASTROINTESTINAL COMMENTS
EMG W/ NERVE CONDUCTION 2 Extremities    Date/Time: 7/11/2023 4:00 PM  Performed by: Naren Ferreira MD  Authorized by: Ian Garza MD         REPORT OF EMG and NERVE CONDUCTION STUDY    Name: Charisse Hernández  Date of Study:  7/11/2023  Referring Physician:  Dr Garza  Test Performed by:  MD Jhon  Full Values Attached  Informed Consent Scanned.   No anesthesia used.   Amount of Blood Loss: none. The patient tolerated this procedure well.       Informed consent was obtained prior to performing this study. Two patient identifiers were confirmed with the patient prior to performing this study. A time out to determine correct patient and and agreement on procedure performed was conducted prior to the concentric needle examination.    Reason for the study:  Lower leg burning pain      Findings:   Nerve conduction studies of  bilateral peroneal motor, bilateral tibial motor, bilateral sural nerves and bilateral H-reflexes were performed.   Amplitudes were reduced throughout and  distal latencies, conduction velocities were prolonged at least mildly as well   and F-waves were borderline.  There was no conduction block.   H reflexes were absent  EMG of selected muscles of the bilateral legs were performed as indicated on the attached sheets. Paraspinal muscles were not sampled due to the patient's NOAC use. Recruitment was reduced distally.  Patient could not tolerate this portion of the test well due to pain  Otherwise,  Insertional activity was normal without fasciculation or fibrillation, normal sized and phasia of motor units.      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 Clinical and laboratory correlation needed regarding causes  2. EMG portion of the testing was limited due to NOAC use and poor patient tolerance.     Naren Ferreira M.D. [facility].     A copy of this EMG/NCS report will be  sent to Dr Garza . Thanks for sending this patient for EMG/NCS. The patient plans to await further recommendations from you regarding the above findings.          Pt reports upper abdominal pain with N/V May 2016. Dx with small bowel obstruction, s/p [facility] admission.  Pt reports she has had several episodes since that time which are becoming more frequent  Most recent episode 1/15/17Now scheduled for Diagnostic Laparoscopy for small bowel obstruction recurrent, 1/27/17. Hx of multiple abdominal surgeries including appendectomy 1972, myomectomy 1995, cystectomy 2002, oophorectomy 2004.  Pt reports upper abdominal pain with N/V May 2016. Dx with small bowel obstruction, scar tissue from previous surgeries, s/p [facility] admission.  Pt reports she has had frequent episodes since that time which are becoming more frequent, most recently 1/15/17.   Now scheduled for Diagnostic Laparoscopy for small bowel obstruction recurrent, 1/27/17.

## 2023-08-20 NOTE — ED CDU PROVIDER DISPOSITION NOTE - NS ED MD DISPO DIVISION
Over the counter medications:  -Take Ibuprofen 600-800 mg every 6-8 hours OR Aleve/naproxen every 12 hours as needed for pain and inflammation. Take with food.   Max 3,200mg of ibuprofen  -Take 1,000mgTylenol every 4 hours as needed for additional pain relief. Max 3,000mg       Other things to try:   -Muscle creams such as biofreeze to apply to back  -Rest the area   -Ice or heat for maximum of 20 minutes at one time.      Ordered from Pharmacy  - Start Tizanidine muscle relaxer.  Start with a 1/2 tab, if no relief in 1 hour take other half.  This medication causes sedation and it should not be taken when working, using power tools, or using motor vehicles.          [facility]

## 2023-09-18 ENCOUNTER — APPOINTMENT (OUTPATIENT)
Dept: PAIN MANAGEMENT | Facility: CLINIC | Age: 58
End: 2023-09-18
Payer: COMMERCIAL

## 2023-09-18 VITALS
BODY MASS INDEX: 26.43 KG/M2 | DIASTOLIC BLOOD PRESSURE: 72 MMHG | HEIGHT: 61 IN | WEIGHT: 140 LBS | SYSTOLIC BLOOD PRESSURE: 106 MMHG | HEART RATE: 76 BPM

## 2023-09-18 PROCEDURE — 99213 OFFICE O/P EST LOW 20 MIN: CPT

## 2023-10-12 ENCOUNTER — NON-APPOINTMENT (OUTPATIENT)
Age: 58
End: 2023-10-12

## 2023-11-28 ENCOUNTER — APPOINTMENT (OUTPATIENT)
Dept: MRI IMAGING | Facility: CLINIC | Age: 58
End: 2023-11-28
Payer: COMMERCIAL

## 2023-11-28 ENCOUNTER — APPOINTMENT (OUTPATIENT)
Dept: CT IMAGING | Facility: CLINIC | Age: 58
End: 2023-11-28
Payer: COMMERCIAL

## 2023-11-28 ENCOUNTER — OUTPATIENT (OUTPATIENT)
Dept: OUTPATIENT SERVICES | Facility: HOSPITAL | Age: 58
LOS: 1 days | End: 2023-11-28
Payer: COMMERCIAL

## 2023-11-28 DIAGNOSIS — Z98.89 OTHER SPECIFIED POSTPROCEDURAL STATES: Chronic | ICD-10-CM

## 2023-11-28 DIAGNOSIS — Z90.721 ACQUIRED ABSENCE OF OVARIES, UNILATERAL: Chronic | ICD-10-CM

## 2023-11-28 DIAGNOSIS — C49.9 MALIGNANT NEOPLASM OF CONNECTIVE AND SOFT TISSUE, UNSPECIFIED: Chronic | ICD-10-CM

## 2023-11-28 DIAGNOSIS — C49.9 MALIGNANT NEOPLASM OF CONNECTIVE AND SOFT TISSUE, UNSPECIFIED: ICD-10-CM

## 2023-11-28 PROCEDURE — 73723 MRI JOINT LWR EXTR W/O&W/DYE: CPT

## 2023-11-28 PROCEDURE — 71250 CT THORAX DX C-: CPT | Mod: 26

## 2023-11-28 PROCEDURE — 73723 MRI JOINT LWR EXTR W/O&W/DYE: CPT | Mod: 26,RT

## 2023-11-28 PROCEDURE — 71250 CT THORAX DX C-: CPT

## 2023-11-28 PROCEDURE — A9585: CPT

## 2023-12-14 ENCOUNTER — APPOINTMENT (OUTPATIENT)
Dept: SURGICAL ONCOLOGY | Facility: CLINIC | Age: 58
End: 2023-12-14
Payer: COMMERCIAL

## 2023-12-14 ENCOUNTER — NON-APPOINTMENT (OUTPATIENT)
Age: 58
End: 2023-12-14

## 2023-12-14 PROCEDURE — 99442: CPT

## 2023-12-28 ENCOUNTER — APPOINTMENT (OUTPATIENT)
Dept: SURGICAL ONCOLOGY | Facility: CLINIC | Age: 58
End: 2023-12-28
Payer: COMMERCIAL

## 2023-12-28 ENCOUNTER — NON-APPOINTMENT (OUTPATIENT)
Age: 58
End: 2023-12-28

## 2023-12-28 VITALS
SYSTOLIC BLOOD PRESSURE: 120 MMHG | HEART RATE: 100 BPM | BODY MASS INDEX: 26.43 KG/M2 | WEIGHT: 140 LBS | DIASTOLIC BLOOD PRESSURE: 74 MMHG | HEIGHT: 61 IN | OXYGEN SATURATION: 98 %

## 2023-12-28 DIAGNOSIS — C49.9 MALIGNANT NEOPLASM OF CONNECTIVE AND SOFT TISSUE, UNSPECIFIED: ICD-10-CM

## 2023-12-28 PROCEDURE — 99213 OFFICE O/P EST LOW 20 MIN: CPT

## 2023-12-28 NOTE — HISTORY OF PRESENT ILLNESS
[de-identified] : 49 y/o female presents for follow up of right groin recurrent sarcoma.  S/p radical resection/right superficial inguinal lymph node dissection and abdominal wall reconstruction 10/17/2014.  Received adjuvant radiation therapy.  Patient feels well. Minimal pain at operative site.  Stable swelling right lower extremity.  Denies drainage or infection of operative site.  Most recent imaging 02/2016. MRI shows stable post-operative changes.  CT chest is without new or suspicious findings.  06/09/2016: recently admitted to [facility] for adhesive SBO treated successfully with nonoperative management.  Feels well, but reports recurrent self limiting episode of signs and symptoms consistent with SBO.  Currently tolerating diet.  Reports similar episodes over last 3 months prior to admission.  Otherwise denies abdominal pain.  Passing flatus.  02/28/2017: Patient is s/p laparoscopic assisted SBR for recurrent partial SBO.  Denies pain.  Tolerating diet without nausea or vomiting.  05/16/2017:  Patient presents for follow up of recurrent right groin sarcoma and small bowel obstruction.  She is s/p laparoscopic SBR for recurrent SBO - intraoperative findings were consistent with radiation thickening and stricture.  She is doing well from SBR.  She denies previously noted abdominal pain and bloating.  Denies nausea or vomiting.  Her appetite is good and reports regular bowel movements.  However, she recently began experiencing increasing pain at right groin sarcoma resection site without associated symptoms of infection, swelling or pain radiating to right leg.  Denies noticeable swelling or mass in right groin region.  Previous imaging was 01/2017 which did not show recurrent disease.  10/26/2017:  Patient presents for 6 months follow up.  She reports recently chronic right inguinal pain has slightly worsened.  She is eating well, without nausea or emesis.  She reports regular bowel movements.  I sent her for CT abdomen/pelvis evaluation 10/24/2017 which did not revealed intra-abdominal pathology or evidence of recurrent soft tissue sarcoma in right groin region.  05/08/2018:   Patient presents for 6 months follow up.  She reports recently chronic right inguinal pain has slightly worsened.  She is eating well, without nausea or emesis.  She reports regular bowel movements.  She had a follow up right hip MRI 11/06/2017 which did not reveal evidence of recurrent disease.  Her pain is managed by Dr. Mercado.  03/05/2019:  Presenting for follow up.  She reports doing well and is without new symptoms.  Recent surveillance CT chest and MRI of right hip show no evidence of recurrent or metastatic disease.  She denies obstructive symptoms.  09/10/2019: Patient presents for routine follow up.  She reports no clinical change compared to her last visit 6 months ago.  She denies symptoms of intestinal obstruction.  Her chronic right hip pain is well managed by Dr. Mercado.  Recent CT chest and right hip MRI 08/28/2019 do not demonstrate evidence of recurrent/metastatic disease.  12/28/2023: Patient reports doing well.  She has not had obstructive symptoms.  She has chronic right sided hip pain that is grossly unchanged. Recent surveillance imaging 11/28/2023 shows no obvious recurrent disease in the right groin and small new lung nodule.

## 2023-12-28 NOTE — PHYSICAL EXAM
[FreeTextEntry1] : Right lower abdominal wall extending to upper thigh with post-radiation changes.  Area firm to palpation without obvious mass.  Incision healed.  No palpable adenopathy. Abdomen otherwise soft and nontender/nondistended. Incisions healed.  No change compared to last exam 2019. [Normal] : supple, no neck mass and thyroid not enlarged [Normal Neck Lymph Nodes] : normal neck lymph nodes  [Normal Supraclavicular Lymph Nodes] : normal supraclavicular lymph nodes [Normal Groin Lymph Nodes] : normal groin lymph nodes [Normal Axillary Lymph Nodes] : normal axillary lymph nodes [Normal] : oriented to person, place and time, with appropriate affect

## 2024-01-09 ENCOUNTER — RX RENEWAL (OUTPATIENT)
Age: 59
End: 2024-01-09

## 2024-02-12 ENCOUNTER — APPOINTMENT (OUTPATIENT)
Dept: PAIN MANAGEMENT | Facility: CLINIC | Age: 59
End: 2024-02-12
Payer: COMMERCIAL

## 2024-02-12 VITALS
HEART RATE: 83 BPM | WEIGHT: 140 LBS | SYSTOLIC BLOOD PRESSURE: 121 MMHG | HEIGHT: 61 IN | BODY MASS INDEX: 26.43 KG/M2 | DIASTOLIC BLOOD PRESSURE: 76 MMHG

## 2024-02-12 PROCEDURE — 99213 OFFICE O/P EST LOW 20 MIN: CPT

## 2024-02-12 NOTE — ASSESSMENT
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   continue Oxycodone 5 mg 2x/day qty 60  UDS performed today Sep 18, 2023  I-Stop reviewed,  reference #: [id number] No signs of aberrant behavior and will continue to monitor for signs of toxicity. Reminded to continue to avoid alcohol. Patient denies other prescribers.  Discussed OD prevention education and prescribed naloxone kit  Safe storage of medication was reviewed.  Opiate agreement was renewed Sep 18, 2023

## 2024-02-12 NOTE — HISTORY OF PRESENT ILLNESS
[FreeTextEntry1] : 59 yo RH female Pmhx HLD, Depression, recurrent sarcoma s/p surgery involving right hip region x 2  2011, 2014 with chronic pain syndrome who presents today for follow up.  Since her last visit reports skin breakdown over right thigh at previous radiation site and has seen Derm s/p bx and was told was nml.  She continues to report pain daily described as a dull ache in right thigh and groin described as a dull ache 3-4/10 with current meds, increased with activity, ambulation as well as damp, cold weather and decreased with leg elevation.  Pain is well controlled with current regimen and without AE.   BM daily.    Prior meds: Methadone 5 mgs qhs Current meds include: Oxycodone 5 mg 2x/day qty 60, Effexor 75mg mg, Seroquel 25 mg q hs.  Atorvastatin 10 mg q hs Ibuprofen prn.   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

## 2024-03-21 ENCOUNTER — NON-APPOINTMENT (OUTPATIENT)
Age: 59
End: 2024-03-21

## 2024-04-11 DIAGNOSIS — I31.39 OTHER PERICARDIAL EFFUSION (NONINFLAMMATORY): ICD-10-CM

## 2024-04-12 ENCOUNTER — APPOINTMENT (OUTPATIENT)
Dept: CARDIOLOGY | Facility: CLINIC | Age: 59
End: 2024-04-12
Payer: COMMERCIAL

## 2024-04-12 PROCEDURE — 93306 TTE W/DOPPLER COMPLETE: CPT

## 2024-05-10 ENCOUNTER — APPOINTMENT (OUTPATIENT)
Dept: CT IMAGING | Facility: CLINIC | Age: 59
End: 2024-05-10
Payer: COMMERCIAL

## 2024-05-10 ENCOUNTER — APPOINTMENT (OUTPATIENT)
Dept: MRI IMAGING | Facility: CLINIC | Age: 59
End: 2024-05-10
Payer: COMMERCIAL

## 2024-05-10 ENCOUNTER — OUTPATIENT (OUTPATIENT)
Dept: OUTPATIENT SERVICES | Facility: HOSPITAL | Age: 59
LOS: 1 days | End: 2024-05-10
Payer: COMMERCIAL

## 2024-05-10 DIAGNOSIS — Z98.89 OTHER SPECIFIED POSTPROCEDURAL STATES: Chronic | ICD-10-CM

## 2024-05-10 DIAGNOSIS — C49.9 MALIGNANT NEOPLASM OF CONNECTIVE AND SOFT TISSUE, UNSPECIFIED: Chronic | ICD-10-CM

## 2024-05-10 DIAGNOSIS — Z90.721 ACQUIRED ABSENCE OF OVARIES, UNILATERAL: Chronic | ICD-10-CM

## 2024-05-10 DIAGNOSIS — C49.9 MALIGNANT NEOPLASM OF CONNECTIVE AND SOFT TISSUE, UNSPECIFIED: ICD-10-CM

## 2024-05-10 PROCEDURE — A9585: CPT

## 2024-05-10 PROCEDURE — 73723 MRI JOINT LWR EXTR W/O&W/DYE: CPT | Mod: 26,RT

## 2024-05-10 PROCEDURE — 73723 MRI JOINT LWR EXTR W/O&W/DYE: CPT

## 2024-05-10 PROCEDURE — 71250 CT THORAX DX C-: CPT | Mod: 26

## 2024-05-10 PROCEDURE — 71250 CT THORAX DX C-: CPT

## 2024-05-15 ENCOUNTER — APPOINTMENT (OUTPATIENT)
Dept: PAIN MANAGEMENT | Facility: CLINIC | Age: 59
End: 2024-05-15
Payer: COMMERCIAL

## 2024-05-15 ENCOUNTER — LABORATORY RESULT (OUTPATIENT)
Age: 59
End: 2024-05-15

## 2024-05-15 PROCEDURE — G2211 COMPLEX E/M VISIT ADD ON: CPT | Mod: NC,1L

## 2024-05-15 PROCEDURE — 99215 OFFICE O/P EST HI 40 MIN: CPT

## 2024-05-15 NOTE — ASSESSMENT
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   continue Oxycodone 5 mg 2x/day qty 60  -Will taper off venlafaxine - 37.5 mg x 2 weeks then dc -start duloxetine 20 mg daily and titrate up as tolerated.  Start PT  UDS performed 5/15/2024 I-Stop reviewed,  reference #: [id number] No signs of aberrant behavior and will continue to monitor for signs of toxicity. Reminded to continue to avoid alcohol. Patient denies other prescribers.  Discussed OD prevention education and prescribed naloxone kit  Safe storage of medication was reviewed.  Opiate agreement was renewed Sep 18, 2023

## 2024-05-15 NOTE — HISTORY OF PRESENT ILLNESS
[FreeTextEntry1] : 59 yo RH female Pmhx HLD, Depression, recurrent sarcoma s/p surgery involving right hip region x 2  2011, 2014 with chronic pain syndrome who presents today for follow up.  Since her last visit reports no new medical issues.  R thigh skin breakdown at site of prior radiation now healed.  She continues to report pain daily described as a dull ache in right thigh and groin described as a dull ache 3/10 with current meds, increased with activity, ambulation as well as damp, cold weather and decreased with leg elevation.  Pain is well controlled with current regimen and without AE. Previously tried increasing Venlafaxine by 37.5mg/day but feels it made her more depressed and decreased back to 75 mg/day.   BM daily.    Prior meds: Methadone 5 mgs qhs, gabapentin, meloxicam  Current meds include: Oxycodone 5 mg 2x/day qty 60, Effexor 75mg mg, Seroquel 25 mg q hs.  Atorvastatin 10 mg q hs Ibuprofen prn.   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

## 2024-05-20 LAB
AMPHET UR-MCNC: NEGATIVE NG/ML
BARBITURATES UR-MCNC: NEGATIVE NG/ML
BENZODIAZ UR-MCNC: NEGATIVE NG/ML
COCAINE METAB.OTHER UR-MCNC: NEGATIVE NG/ML
CREATININE, URINE: 53.2 MG/DL
FENTANYL, URINE: NEGATIVE NG/ML
METHADONE SCREEN, UR: NEGATIVE NG/ML
OPIATES UR-MCNC: NEGATIVE NG/ML
OXYCODONE/OXYMORPHONE, URINE: NORMAL NG/ML
PCP UR-MCNC: NEGATIVE NG/ML
PH, URINE: 5.6
THC UR QL: NEGATIVE NG/ML

## 2024-11-04 ENCOUNTER — APPOINTMENT (OUTPATIENT)
Dept: PAIN MANAGEMENT | Facility: CLINIC | Age: 59
End: 2024-11-04
Payer: COMMERCIAL

## 2024-11-04 ENCOUNTER — LABORATORY RESULT (OUTPATIENT)
Age: 59
End: 2024-11-04

## 2024-11-04 VITALS
SYSTOLIC BLOOD PRESSURE: 132 MMHG | WEIGHT: 145 LBS | HEART RATE: 73 BPM | HEIGHT: 61 IN | BODY MASS INDEX: 27.38 KG/M2 | DIASTOLIC BLOOD PRESSURE: 80 MMHG

## 2024-11-04 PROCEDURE — 99213 OFFICE O/P EST LOW 20 MIN: CPT

## 2024-11-08 LAB
AMPHET UR-MCNC: NEGATIVE NG/ML
BARBITURATES UR-MCNC: NEGATIVE NG/ML
BENZODIAZ UR-MCNC: NEGATIVE NG/ML
COCAINE METAB.OTHER UR-MCNC: NEGATIVE NG/ML
CREATININE, URINE: 50 MG/DL
FENTANYL, URINE: NEGATIVE NG/ML
METHADONE SCREEN, UR: NEGATIVE NG/ML
OPIATES UR-MCNC: NEGATIVE NG/ML
OXYCODONE/OXYMORPHONE, URINE: NORMAL NG/ML
PCP UR-MCNC: NEGATIVE NG/ML
PH, URINE: 5.5
THC UR QL: NEGATIVE NG/ML

## 2024-12-11 DIAGNOSIS — C49.9 MALIGNANT NEOPLASM OF CONNECTIVE AND SOFT TISSUE, UNSPECIFIED: ICD-10-CM

## 2024-12-11 NOTE — ASU PATIENT PROFILE, ADULT - HEALTH/HEALTHCARE ANXIETIES, PROFILE
Reviewed lifestyle modifications - avoidance of food triggers, sit upright for 30min after meals, no large meals 2hrs prior to bed. Will trial famotidine at nighttime. Medication dosing and side effects reviewed.    no

## 2024-12-24 ENCOUNTER — APPOINTMENT (OUTPATIENT)
Dept: CT IMAGING | Facility: CLINIC | Age: 59
End: 2024-12-24
Payer: COMMERCIAL

## 2024-12-24 ENCOUNTER — APPOINTMENT (OUTPATIENT)
Dept: MRI IMAGING | Facility: CLINIC | Age: 59
End: 2024-12-24
Payer: COMMERCIAL

## 2024-12-24 ENCOUNTER — OUTPATIENT (OUTPATIENT)
Dept: OUTPATIENT SERVICES | Facility: HOSPITAL | Age: 59
LOS: 1 days | End: 2024-12-24
Payer: COMMERCIAL

## 2024-12-24 DIAGNOSIS — Z98.89 OTHER SPECIFIED POSTPROCEDURAL STATES: Chronic | ICD-10-CM

## 2024-12-24 DIAGNOSIS — Z00.8 ENCOUNTER FOR OTHER GENERAL EXAMINATION: ICD-10-CM

## 2024-12-24 DIAGNOSIS — C49.9 MALIGNANT NEOPLASM OF CONNECTIVE AND SOFT TISSUE, UNSPECIFIED: Chronic | ICD-10-CM

## 2024-12-24 DIAGNOSIS — Z90.721 ACQUIRED ABSENCE OF OVARIES, UNILATERAL: Chronic | ICD-10-CM

## 2024-12-24 DIAGNOSIS — C49.9 MALIGNANT NEOPLASM OF CONNECTIVE AND SOFT TISSUE, UNSPECIFIED: ICD-10-CM

## 2024-12-24 PROCEDURE — 71250 CT THORAX DX C-: CPT | Mod: 26

## 2024-12-24 PROCEDURE — 73723 MRI JOINT LWR EXTR W/O&W/DYE: CPT | Mod: 26,RT

## 2024-12-24 PROCEDURE — 73723 MRI JOINT LWR EXTR W/O&W/DYE: CPT

## 2024-12-24 PROCEDURE — A9585: CPT

## 2024-12-24 PROCEDURE — 71250 CT THORAX DX C-: CPT

## 2024-12-30 ENCOUNTER — RX RENEWAL (OUTPATIENT)
Age: 59
End: 2024-12-30

## 2024-12-31 ENCOUNTER — APPOINTMENT (OUTPATIENT)
Dept: SURGICAL ONCOLOGY | Facility: CLINIC | Age: 59
End: 2024-12-31
Payer: COMMERCIAL

## 2024-12-31 DIAGNOSIS — K56.50 INTESTINAL ADHESIONS [BANDS], UNSPECIFIED AS TO PARTIAL VERSUS COMPLETE OBSTRUCTION: ICD-10-CM

## 2024-12-31 DIAGNOSIS — C49.9 MALIGNANT NEOPLASM OF CONNECTIVE AND SOFT TISSUE, UNSPECIFIED: ICD-10-CM

## 2024-12-31 PROCEDURE — 99442: CPT | Mod: 93

## 2025-02-07 ENCOUNTER — EMERGENCY (EMERGENCY)
Facility: HOSPITAL | Age: 60
LOS: 1 days | Discharge: HOME | End: 2025-02-07
Attending: EMERGENCY MEDICINE | Admitting: EMERGENCY MEDICINE
Payer: SELF-PAY

## 2025-02-07 VITALS
HEIGHT: 61 IN | WEIGHT: 139.99 LBS | RESPIRATION RATE: 19 BRPM | HEART RATE: 82 BPM | OXYGEN SATURATION: 99 % | DIASTOLIC BLOOD PRESSURE: 86 MMHG | TEMPERATURE: 98 F | SYSTOLIC BLOOD PRESSURE: 146 MMHG

## 2025-02-07 VITALS
TEMPERATURE: 98 F | DIASTOLIC BLOOD PRESSURE: 82 MMHG | OXYGEN SATURATION: 98 % | SYSTOLIC BLOOD PRESSURE: 132 MMHG | HEART RATE: 87 BPM | RESPIRATION RATE: 14 BRPM

## 2025-02-07 DIAGNOSIS — Z98.89 OTHER SPECIFIED POSTPROCEDURAL STATES: Chronic | ICD-10-CM

## 2025-02-07 DIAGNOSIS — Z90.721 ACQUIRED ABSENCE OF OVARIES, UNILATERAL: Chronic | ICD-10-CM

## 2025-02-07 DIAGNOSIS — C49.9 MALIGNANT NEOPLASM OF CONNECTIVE AND SOFT TISSUE, UNSPECIFIED: Chronic | ICD-10-CM

## 2025-02-07 PROCEDURE — 99285 EMERGENCY DEPT VISIT HI MDM: CPT

## 2025-02-07 PROCEDURE — 70450 CT HEAD/BRAIN W/O DYE: CPT | Mod: MC

## 2025-02-07 PROCEDURE — 99284 EMERGENCY DEPT VISIT MOD MDM: CPT | Mod: 25

## 2025-02-07 PROCEDURE — 72125 CT NECK SPINE W/O DYE: CPT | Mod: MC

## 2025-02-07 PROCEDURE — 72100 X-RAY EXAM L-S SPINE 2/3 VWS: CPT

## 2025-02-07 PROCEDURE — 72100 X-RAY EXAM L-S SPINE 2/3 VWS: CPT | Mod: 26

## 2025-02-07 PROCEDURE — 72125 CT NECK SPINE W/O DYE: CPT | Mod: 26

## 2025-02-07 PROCEDURE — 70450 CT HEAD/BRAIN W/O DYE: CPT | Mod: 26

## 2025-02-07 RX ORDER — IBUPROFEN 600 MG/1
1 TABLET, FILM COATED ORAL
Qty: 21 | Refills: 0
Start: 2025-02-07 | End: 2025-02-13

## 2025-02-07 RX ORDER — LIDOCAINE HYDROCHLORIDE 30 MG/G
1 CREAM TOPICAL
Qty: 1 | Refills: 0
Start: 2025-02-07 | End: 2025-02-11

## 2025-02-07 NOTE — ED PROVIDER NOTE - IV ALTEPLASE ADMIN OUTSIDE HIDDEN
Post-Care Instructions: I reviewed with the patient in detail post-care instructions. Patient is to wear sunprotection, and avoid picking at any of the treated lesions. Pt may apply Vaseline to crusted or scabbing areas. Detail Level: Detailed Duration Of Freeze Thaw-Cycle (Seconds): 0 Consent: The patient's consent was obtained including but not limited to risks of crusting, scabbing, blistering, scarring, darker or lighter pigmentary change, recurrence, incomplete removal and infection. Render Post-Care Instructions In Note?: no show
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
## 2025-02-07 NOTE — ED PROVIDER NOTE - CONSTITUTIONAL, MLM
normal... Well appearing, awake, alert, oriented to person, place, time/situation and in no apparent distress. mild ttp left occipital area no deformity nrom of neck nvi

## 2025-02-07 NOTE — ED PROVIDER NOTE - PATIENT PORTAL LINK FT
You can access the FollowMyHealth Patient Portal offered by [facility] by registering at the following website: [email or web address]. By joining [employer]’s FollowMyHealth portal, you will also be able to view your health information using other applications (apps) compatible with our system.
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
## 2025-02-12 ENCOUNTER — APPOINTMENT (OUTPATIENT)
Dept: PAIN MANAGEMENT | Facility: CLINIC | Age: 60
End: 2025-02-12
Payer: COMMERCIAL

## 2025-02-12 ENCOUNTER — NON-APPOINTMENT (OUTPATIENT)
Age: 60
End: 2025-02-12

## 2025-02-12 DIAGNOSIS — M54.2 CERVICALGIA: ICD-10-CM

## 2025-02-12 PROCEDURE — 99214 OFFICE O/P EST MOD 30 MIN: CPT

## 2025-02-12 PROCEDURE — G2211 COMPLEX E/M VISIT ADD ON: CPT | Mod: NC

## 2025-02-12 RX ORDER — CYCLOBENZAPRINE HYDROCHLORIDE 5 MG/1
5 TABLET, FILM COATED ORAL
Qty: 60 | Refills: 3 | Status: ACTIVE | COMMUNITY
Start: 2025-02-12 | End: 1900-01-01

## 2025-03-05 ENCOUNTER — NON-APPOINTMENT (OUTPATIENT)
Age: 60
End: 2025-03-05

## 2025-03-19 NOTE — ED ADULT NURSE NOTE - NSFALLRISKASMTTYPE_ED_ALL_ED
I read the urology note from 3/7, and it seemed like the patient and the urologist discussed radiation, and that they decided against going for radiation. I have no other information. TY Initial (On Arrival)

## 2025-06-16 ENCOUNTER — NON-APPOINTMENT (OUTPATIENT)
Age: 60
End: 2025-06-16

## 2025-06-16 ENCOUNTER — APPOINTMENT (OUTPATIENT)
Dept: PAIN MANAGEMENT | Facility: CLINIC | Age: 60
End: 2025-06-16
Payer: COMMERCIAL

## 2025-06-16 PROCEDURE — 99212 OFFICE O/P EST SF 10 MIN: CPT | Mod: 95

## 2025-07-10 ENCOUNTER — APPOINTMENT (OUTPATIENT)
Dept: CARDIOLOGY | Facility: CLINIC | Age: 60
End: 2025-07-10
Payer: COMMERCIAL

## 2025-07-10 PROCEDURE — 93306 TTE W/DOPPLER COMPLETE: CPT

## 2025-07-28 ENCOUNTER — NON-APPOINTMENT (OUTPATIENT)
Age: 60
End: 2025-07-28

## 2025-08-28 ENCOUNTER — NON-APPOINTMENT (OUTPATIENT)
Age: 60
End: 2025-08-28